# Patient Record
Sex: FEMALE | Race: WHITE | URBAN - METROPOLITAN AREA
[De-identification: names, ages, dates, MRNs, and addresses within clinical notes are randomized per-mention and may not be internally consistent; named-entity substitution may affect disease eponyms.]

---

## 2023-10-12 ENCOUNTER — TRANSCRIBE ORDERS (OUTPATIENT)
Dept: ADMINISTRATIVE | Facility: HOSPITAL | Age: 28
End: 2023-10-12

## 2023-10-12 ENCOUNTER — TRANSCRIBE ORDERS (OUTPATIENT)
Dept: PULMONOLOGY | Facility: HOSPITAL | Age: 28
End: 2023-10-12

## 2023-10-12 DIAGNOSIS — R06.02 SOB (SHORTNESS OF BREATH): Primary | ICD-10-CM

## 2023-10-12 DIAGNOSIS — R06.02 SHORTNESS OF BREATH: Primary | ICD-10-CM

## 2023-10-12 DIAGNOSIS — I31.39 PERICARDIAL EFFUSION: ICD-10-CM

## 2025-01-22 ENCOUNTER — OFFICE VISIT (OUTPATIENT)
Dept: OBSTETRICS AND GYNECOLOGY | Facility: CLINIC | Age: 30
End: 2025-01-22
Payer: COMMERCIAL

## 2025-01-22 VITALS
SYSTOLIC BLOOD PRESSURE: 134 MMHG | BODY MASS INDEX: 40.59 KG/M2 | HEIGHT: 65 IN | WEIGHT: 243.6 LBS | DIASTOLIC BLOOD PRESSURE: 86 MMHG

## 2025-01-22 DIAGNOSIS — F90.9 ATTENTION DEFICIT HYPERACTIVITY DISORDER (ADHD), UNSPECIFIED ADHD TYPE: ICD-10-CM

## 2025-01-22 DIAGNOSIS — Z87.59 HISTORY OF GESTATIONAL HYPERTENSION: ICD-10-CM

## 2025-01-22 DIAGNOSIS — K50.90 CROHN'S DISEASE WITHOUT COMPLICATION, UNSPECIFIED GASTROINTESTINAL TRACT LOCATION: ICD-10-CM

## 2025-01-22 DIAGNOSIS — F31.9 BIPOLAR AFFECTIVE DISORDER, REMISSION STATUS UNSPECIFIED: ICD-10-CM

## 2025-01-22 DIAGNOSIS — O34.219 HISTORY OF CESAREAN DELIVERY, CURRENTLY PREGNANT: ICD-10-CM

## 2025-01-22 DIAGNOSIS — O21.9 NAUSEA AND VOMITING DURING PREGNANCY PRIOR TO 22 WEEKS GESTATION: ICD-10-CM

## 2025-01-22 DIAGNOSIS — R31.9 HEMATURIA, UNSPECIFIED TYPE: ICD-10-CM

## 2025-01-22 DIAGNOSIS — F10.11 HISTORY OF ALCOHOL ABUSE: ICD-10-CM

## 2025-01-22 DIAGNOSIS — Z87.59 HISTORY OF INTRAUTERINE FETAL DEATH IN PREVIOUS PREGNANCY: ICD-10-CM

## 2025-01-22 DIAGNOSIS — F43.10 PTSD (POST-TRAUMATIC STRESS DISORDER): ICD-10-CM

## 2025-01-22 DIAGNOSIS — O99.210 OBESITY AFFECTING PREGNANCY, ANTEPARTUM, UNSPECIFIED OBESITY TYPE: ICD-10-CM

## 2025-01-22 DIAGNOSIS — Z87.59 HISTORY OF TWIN PREGNANCY IN PRIOR PREGNANCY: ICD-10-CM

## 2025-01-22 DIAGNOSIS — N92.6 MISSED MENSES: Primary | ICD-10-CM

## 2025-01-22 DIAGNOSIS — F19.91 HISTORY OF DRUG USE: ICD-10-CM

## 2025-01-22 DIAGNOSIS — F41.9 ANXIETY AND DEPRESSION: ICD-10-CM

## 2025-01-22 DIAGNOSIS — Z86.19 HISTORY OF HEPATITIS C: ICD-10-CM

## 2025-01-22 DIAGNOSIS — F32.A ANXIETY AND DEPRESSION: ICD-10-CM

## 2025-01-22 DIAGNOSIS — O09.899 SHORT INTERVAL BETWEEN PREGNANCIES AFFECTING PREGNANCY, ANTEPARTUM: ICD-10-CM

## 2025-01-22 LAB
B-HCG UR QL: POSITIVE
BILIRUB BLD-MCNC: NEGATIVE MG/DL
CLARITY, POC: CLEAR
COLOR UR: ABNORMAL
EXPIRATION DATE: ABNORMAL
GLUCOSE UR STRIP-MCNC: NEGATIVE MG/DL
INTERNAL NEGATIVE CONTROL: NEGATIVE
INTERNAL POSITIVE CONTROL: POSITIVE
KETONES UR QL: ABNORMAL
LEUKOCYTE EST, POC: ABNORMAL
Lab: 55
NITRITE UR-MCNC: NEGATIVE MG/ML
PH UR: 8 [PH] (ref 5–8)
PROT UR STRIP-MCNC: ABNORMAL MG/DL
RBC # UR STRIP: ABNORMAL /UL
SP GR UR: 1 (ref 1–1.03)
UROBILINOGEN UR QL: ABNORMAL

## 2025-01-22 RX ORDER — VENLAFAXINE HYDROCHLORIDE 37.5 MG/1
CAPSULE, EXTENDED RELEASE ORAL
COMMUNITY

## 2025-01-22 RX ORDER — SWAB
1 SWAB, NON-MEDICATED MISCELLANEOUS DAILY
Qty: 30 EACH | Refills: 6 | Status: SHIPPED | OUTPATIENT
Start: 2025-01-22

## 2025-01-22 RX ORDER — ASPIRIN 81 MG/1
81 TABLET ORAL DAILY
Qty: 30 TABLET | Refills: 6 | Status: SHIPPED | OUTPATIENT
Start: 2025-01-22

## 2025-01-22 RX ORDER — PYRIDOXINE HCL (VITAMIN B6) 25 MG
25 TABLET ORAL
COMMUNITY
Start: 2024-12-30 | End: 2025-02-28

## 2025-01-22 RX ORDER — ONDANSETRON 4 MG/1
4 TABLET, ORALLY DISINTEGRATING ORAL EVERY 8 HOURS PRN
Qty: 30 TABLET | Refills: 2 | Status: SHIPPED | OUTPATIENT
Start: 2025-01-22

## 2025-01-22 RX ORDER — SWAB
1 SWAB, NON-MEDICATED MISCELLANEOUS DAILY
COMMUNITY
Start: 2024-10-04 | End: 2025-01-22 | Stop reason: SDUPTHER
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
## 2025-01-23 PROBLEM — Z87.59 HISTORY OF TWIN PREGNANCY IN PRIOR PREGNANCY: Status: ACTIVE | Noted: 2025-01-23

## 2025-01-23 PROBLEM — Z87.59 HISTORY OF GESTATIONAL HYPERTENSION: Status: ACTIVE | Noted: 2025-01-23

## 2025-01-23 PROBLEM — O09.899 SHORT INTERVAL BETWEEN PREGNANCIES AFFECTING PREGNANCY, ANTEPARTUM: Status: ACTIVE | Noted: 2025-01-23

## 2025-01-23 PROBLEM — Z87.59 HISTORY OF INTRAUTERINE FETAL DEATH IN PREVIOUS PREGNANCY: Status: ACTIVE | Noted: 2025-01-23

## 2025-01-23 PROBLEM — O34.219 HISTORY OF CESAREAN DELIVERY, CURRENTLY PREGNANT: Status: ACTIVE | Noted: 2025-01-23

## 2025-01-23 LAB
BACTERIA UR CULT: NO GROWTH
BACTERIA UR CULT: NORMAL

## 2025-01-24 ENCOUNTER — TELEPHONE (OUTPATIENT)
Dept: OBSTETRICS AND GYNECOLOGY | Facility: CLINIC | Age: 30
End: 2025-01-24

## 2025-01-24 NOTE — TELEPHONE ENCOUNTER
Caller: Adeel Resendiz    Relationship: Self    Best call back number: [phone]    What was the call regarding: PT RETURNING A MISSED CALL

## 2025-01-27 PROBLEM — B00.9 HERPES: Status: ACTIVE | Noted: 2025-01-27

## 2025-01-27 PROBLEM — A60.00 GENITAL HERPES: Status: ACTIVE | Noted: 2025-01-27

## 2025-02-05 ENCOUNTER — INITIAL PRENATAL (OUTPATIENT)
Dept: OBSTETRICS AND GYNECOLOGY | Facility: CLINIC | Age: 30
End: 2025-02-05
Payer: COMMERCIAL

## 2025-02-05 VITALS — BODY MASS INDEX: 39.61 KG/M2 | SYSTOLIC BLOOD PRESSURE: 138 MMHG | DIASTOLIC BLOOD PRESSURE: 80 MMHG | WEIGHT: 238 LBS

## 2025-02-05 DIAGNOSIS — F19.91 HISTORY OF DRUG USE: ICD-10-CM

## 2025-02-05 DIAGNOSIS — Z86.19 HX OF HEPATITIS C: ICD-10-CM

## 2025-02-05 DIAGNOSIS — Z36.9 ENCOUNTER FOR ANTENATAL SCREENING, UNSPECIFIED: Primary | ICD-10-CM

## 2025-02-05 DIAGNOSIS — Z3A.09 9 WEEKS GESTATION OF PREGNANCY: ICD-10-CM

## 2025-02-05 DIAGNOSIS — F10.11 HISTORY OF ALCOHOL ABUSE: ICD-10-CM

## 2025-02-05 DIAGNOSIS — Z01.419 ROUTINE GYNECOLOGICAL EXAMINATION: ICD-10-CM

## 2025-02-05 DIAGNOSIS — Z11.51 SCREENING FOR HUMAN PAPILLOMAVIRUS (HPV): ICD-10-CM

## 2025-02-05 DIAGNOSIS — O16.9 HYPERTENSION AFFECTING PREGNANCY, ANTEPARTUM: ICD-10-CM

## 2025-02-05 LAB
BILIRUB BLD-MCNC: NEGATIVE MG/DL
CLARITY, POC: CLEAR
COLOR UR: YELLOW
GLUCOSE UR STRIP-MCNC: NEGATIVE MG/DL
KETONES UR QL: ABNORMAL
LEUKOCYTE EST, POC: NEGATIVE
NITRITE UR-MCNC: NEGATIVE MG/ML
PH UR: 8 [PH] (ref 5–8)
PROT UR STRIP-MCNC: ABNORMAL MG/DL
RBC # UR STRIP: ABNORMAL /UL
SP GR UR: 1.03 (ref 1–1.03)
UROBILINOGEN UR QL: NORMAL

## 2025-02-05 RX ORDER — GABAPENTIN 300 MG/1
1 CAPSULE ORAL 3 TIMES DAILY
COMMUNITY
Start: 2025-01-07

## 2025-02-05 RX ORDER — CHLORHEXIDINE GLUCONATE ORAL RINSE 1.2 MG/ML
SOLUTION DENTAL
COMMUNITY
Start: 2024-10-29

## 2025-02-05 RX ORDER — BUSPIRONE HYDROCHLORIDE 10 MG/1
1 TABLET ORAL EVERY 12 HOURS SCHEDULED
COMMUNITY
Start: 2024-12-05

## 2025-02-05 RX ORDER — ONDANSETRON 4 MG/1
TABLET, FILM COATED ORAL
COMMUNITY
Start: 2024-10-29

## 2025-02-05 RX ORDER — FLUOROMETHOLONE 1 MG/ML
SUSPENSION/ DROPS OPHTHALMIC
COMMUNITY
Start: 2024-12-16

## 2025-02-05 RX ORDER — FLUOXETINE 40 MG/1
40 CAPSULE ORAL DAILY
COMMUNITY

## 2025-02-05 RX ORDER — CARIPRAZINE 3 MG/1
1 CAPSULE, GELATIN COATED ORAL DAILY
COMMUNITY
Start: 2024-12-08

## 2025-02-05 RX ORDER — FOLIC ACID 1 MG/1
1 TABLET ORAL DAILY
Qty: 90 TABLET | Refills: 3 | Status: SHIPPED | OUTPATIENT
Start: 2025-02-05

## 2025-02-05 RX ORDER — HYDROXYZINE HYDROCHLORIDE 25 MG/1
TABLET, FILM COATED ORAL
COMMUNITY
Start: 2024-10-25

## 2025-02-05 RX ORDER — DEXTROAMPHETAMINE SACCHARATE, AMPHETAMINE ASPARTATE, DEXTROAMPHETAMINE SULFATE AND AMPHETAMINE SULFATE 5; 5; 5; 5 MG/1; MG/1; MG/1; MG/1
TABLET ORAL
COMMUNITY
Start: 2025-01-16

## 2025-02-05 RX ORDER — ALBUTEROL SULFATE 90 UG/1
AEROSOL, METERED RESPIRATORY (INHALATION)
COMMUNITY
Start: 2024-10-25

## 2025-02-05 RX ORDER — CYCLOBENZAPRINE HCL 10 MG
10 TABLET ORAL
COMMUNITY
Start: 2024-11-27

## 2025-02-05 RX ORDER — ALBUTEROL SULFATE 90 UG/1
2 INHALANT RESPIRATORY (INHALATION) EVERY 4 HOURS PRN
Qty: 8 G | Refills: 0 | Status: SHIPPED | OUTPATIENT
Start: 2025-02-05
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
## 2025-02-06 LAB
CREAT UR-MCNC: 80 MG/DL
PROT UR-MCNC: 15.8 MG/DL
PROT/CREAT UR: 198 MG/G CREAT (ref 0–200)

## 2025-02-07 ENCOUNTER — HOSPITAL ENCOUNTER (OUTPATIENT)
Facility: HOSPITAL | Age: 30
Setting detail: OBSERVATION
Discharge: HOME | End: 2025-02-08
Attending: EMERGENCY MEDICINE | Admitting: OBSTETRICS & GYNECOLOGY
Payer: COMMERCIAL

## 2025-02-07 ENCOUNTER — APPOINTMENT (OUTPATIENT)
Dept: ULTRASOUND IMAGING | Facility: HOSPITAL | Age: 30
End: 2025-02-07
Payer: COMMERCIAL

## 2025-02-07 DIAGNOSIS — O03.9 MISCARRIAGE: Primary | ICD-10-CM

## 2025-02-07 LAB
ALBUMIN SERPL-MCNC: 4.2 G/DL (ref 3.5–5.2)
ALBUMIN/GLOB SERPL: 1.4 G/DL
ALP SERPL-CCNC: 91 U/L (ref 39–117)
ALT SERPL W P-5'-P-CCNC: 31 U/L (ref 1–33)
ANION GAP SERPL CALCULATED.3IONS-SCNC: 14.4 MMOL/L (ref 5–15)
AST SERPL-CCNC: 18 U/L (ref 1–32)
BACTERIA UR QL AUTO: ABNORMAL /HPF
BASOPHILS # BLD AUTO: 0.03 10*3/MM3 (ref 0–0.2)
BASOPHILS NFR BLD AUTO: 0.1 % (ref 0–1.5)
BILIRUB SERPL-MCNC: 0.8 MG/DL (ref 0–1.2)
BILIRUB UR QL STRIP: NEGATIVE
BUN SERPL-MCNC: 4 MG/DL (ref 6–20)
BUN/CREAT SERPL: 8 (ref 7–25)
CALCIUM SPEC-SCNC: 9.2 MG/DL (ref 8.6–10.5)
CHLORIDE SERPL-SCNC: 96 MMOL/L (ref 98–107)
CLARITY UR: CLEAR
CO2 SERPL-SCNC: 19.6 MMOL/L (ref 22–29)
COLOR UR: YELLOW
CREAT SERPL-MCNC: 0.5 MG/DL (ref 0.57–1)
DEPRECATED RDW RBC AUTO: 45.8 FL (ref 37–54)
EGFRCR SERPLBLD CKD-EPI 2021: 131.2 ML/MIN/1.73
EOSINOPHIL # BLD AUTO: 0.08 10*3/MM3 (ref 0–0.4)
EOSINOPHIL NFR BLD AUTO: 0.3 % (ref 0.3–6.2)
ERYTHROCYTE [DISTWIDTH] IN BLOOD BY AUTOMATED COUNT: 12.7 % (ref 12.3–15.4)
GLOBULIN UR ELPH-MCNC: 3.1 GM/DL
GLUCOSE SERPL-MCNC: 141 MG/DL (ref 65–99)
GLUCOSE UR STRIP-MCNC: NEGATIVE MG/DL
HCT VFR BLD AUTO: 36.9 % (ref 34–46.6)
HGB BLD-MCNC: 12.2 G/DL (ref 12–15.9)
HGB UR QL STRIP.AUTO: ABNORMAL
HOLD SPECIMEN: NORMAL
HYALINE CASTS UR QL AUTO: ABNORMAL /LPF
IMM GRANULOCYTES # BLD AUTO: 0.05 10*3/MM3 (ref 0–0.05)
IMM GRANULOCYTES NFR BLD AUTO: 0.2 % (ref 0–0.5)
KETONES UR QL STRIP: NEGATIVE
LEUKOCYTE ESTERASE UR QL STRIP.AUTO: ABNORMAL
LYMPHOCYTES # BLD AUTO: 1.58 10*3/MM3 (ref 0.7–3.1)
LYMPHOCYTES NFR BLD AUTO: 6.8 % (ref 19.6–45.3)
MCH RBC QN AUTO: 32.7 PG (ref 26.6–33)
MCHC RBC AUTO-ENTMCNC: 33.1 G/DL (ref 31.5–35.7)
MCV RBC AUTO: 98.9 FL (ref 79–97)
MONOCYTES # BLD AUTO: 0.71 10*3/MM3 (ref 0.1–0.9)
MONOCYTES NFR BLD AUTO: 3 % (ref 5–12)
NEUTROPHILS NFR BLD AUTO: 20.89 10*3/MM3 (ref 1.7–7)
NEUTROPHILS NFR BLD AUTO: 89.6 % (ref 42.7–76)
NITRITE UR QL STRIP: NEGATIVE
PH UR STRIP.AUTO: 6 [PH] (ref 4.5–8)
PLATELET # BLD AUTO: 229 10*3/MM3 (ref 140–450)
PMV BLD AUTO: 10.4 FL (ref 6–12)
POTASSIUM SERPL-SCNC: 3.3 MMOL/L (ref 3.5–5.2)
PROT SERPL-MCNC: 7.3 G/DL (ref 6–8.5)
PROT UR QL STRIP: NEGATIVE
RBC # BLD AUTO: 3.73 10*6/MM3 (ref 3.77–5.28)
RBC # UR STRIP: ABNORMAL /HPF
REF LAB TEST METHOD: ABNORMAL
SODIUM SERPL-SCNC: 130 MMOL/L (ref 136–145)
SP GR UR STRIP: 1.01 (ref 1–1.03)
SQUAMOUS #/AREA URNS HPF: ABNORMAL /HPF
UROBILINOGEN UR QL STRIP: ABNORMAL
WBC # UR STRIP: ABNORMAL /HPF
WBC NRBC COR # BLD AUTO: 23.34 10*3/MM3 (ref 3.4–10.8)

## 2025-02-07 PROCEDURE — 81001 URINALYSIS AUTO W/SCOPE: CPT | Performed by: EMERGENCY MEDICINE

## 2025-02-07 PROCEDURE — 99283 EMERGENCY DEPT VISIT LOW MDM: CPT | Performed by: EMERGENCY MEDICINE

## 2025-02-07 PROCEDURE — 85025 COMPLETE CBC W/AUTO DIFF WBC: CPT | Performed by: EMERGENCY MEDICINE

## 2025-02-07 PROCEDURE — 25010000002 KETOROLAC TROMETHAMINE PER 15 MG: Performed by: EMERGENCY MEDICINE

## 2025-02-07 PROCEDURE — 63710000001 ONDANSETRON ODT 4 MG TABLET DISPERSIBLE: Performed by: OBSTETRICS & GYNECOLOGY

## 2025-02-07 PROCEDURE — G0378 HOSPITAL OBSERVATION PER HR: HCPCS

## 2025-02-07 PROCEDURE — 96372 THER/PROPH/DIAG INJ SC/IM: CPT

## 2025-02-07 PROCEDURE — 99285 EMERGENCY DEPT VISIT HI MDM: CPT

## 2025-02-07 PROCEDURE — 87591 N.GONORRHOEAE DNA AMP PROB: CPT | Performed by: EMERGENCY MEDICINE

## 2025-02-07 PROCEDURE — 88305 TISSUE EXAM BY PATHOLOGIST: CPT | Performed by: OBSTETRICS & GYNECOLOGY

## 2025-02-07 PROCEDURE — 80053 COMPREHEN METABOLIC PANEL: CPT | Performed by: EMERGENCY MEDICINE

## 2025-02-07 PROCEDURE — 87491 CHLMYD TRACH DNA AMP PROBE: CPT | Performed by: EMERGENCY MEDICINE

## 2025-02-07 RX ORDER — HYDROCODONE BITARTRATE AND ACETAMINOPHEN 5; 325 MG/1; MG/1
1 TABLET ORAL ONCE
Status: COMPLETED | OUTPATIENT
Start: 2025-02-07 | End: 2025-02-07

## 2025-02-07 RX ORDER — KETOROLAC TROMETHAMINE 30 MG/ML
30 INJECTION, SOLUTION INTRAMUSCULAR; INTRAVENOUS ONCE
Status: COMPLETED | OUTPATIENT
Start: 2025-02-07 | End: 2025-02-07

## 2025-02-07 RX ORDER — HYDROCODONE BITARTRATE AND ACETAMINOPHEN 5; 325 MG/1; MG/1
1 TABLET ORAL EVERY 4 HOURS PRN
Status: DISCONTINUED | OUTPATIENT
Start: 2025-02-07 | End: 2025-02-08 | Stop reason: HOSPADM

## 2025-02-07 RX ORDER — ONDANSETRON 4 MG/1
4 TABLET, ORALLY DISINTEGRATING ORAL EVERY 6 HOURS PRN
Status: DISCONTINUED | OUTPATIENT
Start: 2025-02-07 | End: 2025-02-08 | Stop reason: HOSPADM

## 2025-02-07 RX ORDER — GUAIFENESIN 600 MG/1
600 TABLET, EXTENDED RELEASE ORAL EVERY 12 HOURS SCHEDULED
Status: DISCONTINUED | OUTPATIENT
Start: 2025-02-07 | End: 2025-02-08 | Stop reason: HOSPADM

## 2025-02-07 RX ORDER — KETOROLAC TROMETHAMINE 30 MG/ML
15 INJECTION, SOLUTION INTRAMUSCULAR; INTRAVENOUS ONCE
Status: DISCONTINUED | OUTPATIENT
Start: 2025-02-07 | End: 2025-02-07

## 2025-02-07 RX ORDER — IBUPROFEN 600 MG/1
600 TABLET, FILM COATED ORAL EVERY 6 HOURS PRN
Status: DISCONTINUED | OUTPATIENT
Start: 2025-02-07 | End: 2025-02-08 | Stop reason: HOSPADM

## 2025-02-07 RX ADMIN — HYDROCODONE BITARTRATE AND ACETAMINOPHEN 1 TABLET: 5; 325 TABLET ORAL at 15:52

## 2025-02-07 RX ADMIN — IBUPROFEN 600 MG: 600 TABLET, FILM COATED ORAL at 17:06

## 2025-02-07 RX ADMIN — KETOROLAC TROMETHAMINE 30 MG: 30 INJECTION, SOLUTION INTRAMUSCULAR; INTRAVENOUS at 10:52

## 2025-02-07 RX ADMIN — GUAIFENESIN 600 MG: 600 TABLET, EXTENDED RELEASE ORAL at 20:59

## 2025-02-07 RX ADMIN — ONDANSETRON 4 MG: 4 TABLET, ORALLY DISINTEGRATING ORAL at 13:21

## 2025-02-07 RX ADMIN — HYDROCODONE BITARTRATE AND ACETAMINOPHEN 1 TABLET: 5; 325 TABLET ORAL at 12:50
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
## 2025-02-07 NOTE — H&P
Patient Care Team:  Manuel Evans MD as PCP - General (Family Medicine)  Lexis Wilkersno APRN as Nurse Practitioner (Obstetrics and Gynecology)    Chief complaint pelvic cramping, miscarriage       HPI: This is a 29-year-old  6 para 2-2-2-4 at 10 weeks and 1 day by LMP and first trimester ultrasound.  Had severe onset cramping last night followed by active heavy bleeding and passage of large amount of tissue including a miscarriage.  Patient has previously miscarried before.  Bleeding slowed slightly but cramping persisted into this morning when she presented to the ED with cramping.  ED evaluated the patient.  Small amount of bleeding from the cervical os.  Patient had refused pelvic ultrasound.  She is Rh+.  Hemoglobin is 12.2 but white count was 23,000.  Admitted for observation due to elevated white blood cell count.    ROS:   Constitutional: Negative for appetite change, fever and unexpected weight change.   Respiratory: Negative for cough and shortness of breath.    Cardiovascular: Negative for chest pain and palpitations.   Gastrointestinal: Negative for abdominal distention, abdominal pain, constipation, diarrhea, nausea and vomiting.   Endocrine: Negative.    Genitourinary: Negative for dyspareunia, menstrual problem is present (see HPI, pelvic pain is present in the form of cramping and vaginal discharge other than bleeding.   Skin: Negative.    Neurological: Negative for dizziness and headaches.   Hematological: Negative.    Psychiatric/Behavioral: Negative for dysphoric mood and sleep disturbance. The patient is not nervous/anxious.        PMH:   Past Medical History:   Diagnosis Date    ADHD     Anxiety and depression     Bipolar affective disorder     Crohn's disease     History of hepatitis C     PTSD (post-traumatic stress disorder)          PSH:   Past Surgical History:   Procedure Laterality Date    ANKLE SURGERY Left 2019    MANDIBLE FRACTURE SURGERY  2024    surgery  x 2; metal plates       SoHx:   Social History     Socioeconomic History    Marital status: Single   Tobacco Use    Smoking status: Every Day     Types: Cigarettes    Smokeless tobacco: Never   Vaping Use    Vaping status: Every Day    Substances: Nicotine, THC, Flavoring   Substance and Sexual Activity    Alcohol use: Yes     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     Current Outpatient Medications on File Prior to Encounter   Medication Sig Dispense Refill    busPIRone (BUSPAR) 10 MG tablet Take 1 tablet by mouth Every 12 (Twelve) Hours.      chlorhexidine (PERIDEX) 0.12 % solution       cyclobenzaprine (FLEXERIL) 10 MG tablet Take 1 tablet by mouth.      FLUoxetine (PROzac) 20 MG capsule Take 1 capsule by mouth Daily.      FLUoxetine (PROzac) 40 MG capsule Take 1 capsule by mouth Daily.      folic acid (FOLVITE) 1 MG tablet Take 1 tablet by mouth Daily. 90 tablet 3    melatonin 5 MG tablet tablet       ondansetron (ZOFRAN) 4 MG tablet       ondansetron ODT (ZOFRAN-ODT) 4 MG disintegrating tablet Place 1 tablet on the tongue Every 8 (Eight) Hours As Needed for Nausea or Vomiting. 30 tablet 2    Prenatal 28-0.8 MG tablet Take 1 tablet by mouth Daily. 30 each 6    albuterol sulfate  (90 Base) MCG/ACT inhaler Inhale 2 puffs Every 4 (Four) Hours As Needed for Wheezing. 8 g 0    amphetamine-dextroamphetamine (ADDERALL) 20 MG tablet TAKE 1 TABLET BY MOUTH TWICE DAILY FOR ADHD SYMPTOMS      aspirin 81 MG EC tablet Take 1 tablet by mouth Daily. 30 tablet 6    fluorometholone (FML) 0.1 % ophthalmic suspension INSTILL 1  DROP INTO RIGHT EYE 4 TIMES DAILY AS DIRECTED FOR 7 DAYS      gabapentin (NEURONTIN) 300 MG capsule Take 1 capsule by mouth 3 times a day. (Patient not taking: Reported on 2/5/2025)      hydrOXYzine (ATARAX) 25 MG tablet  (Patient not taking: Reported on 2/5/2025)      venlafaxine XR (Effexor XR) 37.5 MG 24 hr capsule Take  by mouth. (Patient not taking: Reported on 2/5/2025)      Ventolin  (90 Base) MCG/ACT inhaler       Vraylar 3 MG capsule capsule Take 1 capsule by mouth Daily. (Patient not taking: Reported on 2/5/2025)               Vital Signs  Temp:  [98.8 °F (37.1 °C)-98.9 °F (37.2 °C)] 98.9 °F (37.2 °C)  Heart Rate:  [100-115] 100  Resp:  [16-18] 16  BP: (114-134)/(65-81) 134/81    Physical Exam:  Constitutional: She is oriented to person, place, and time. She appears well-developed and well-nourished.   HENT:   Head: Normocephalic and atraumatic.   Cardiovascular: Normal rate and regular rhythm.    Pulmonary/Chest: Effort normal. No respiratory distress.   Abdominal: Soft. Bowel sounds are normal. She exhibits no distension and no mass. There is no tenderness. There is no rebound and no guarding.   Musculoskeletal: Normal range of motion.   Neurological: She is alert and oriented to person, place, and time.   Skin: Skin is warm and dry.   Psychiatric: She has a normal mood and affect. Her behavior is normal. Judgment and thought content normal.   Nursing note and vitals reviewed.  Debilities/Disabilities Identified: None  Emotional Behavior: Appropriate    Labs: Rh+, normal hemoglobin      Assessment/Plan: Completed SAB    Patient has declined ultrasound.  Suspect completed SAB.  Bleeding has decreased and physical exam uterus is not palpable.  No acute abdominal signs.  Observe overnight for fever, vital signs and pad count.  Would recommend repeat ultrasound to confirm empty uterus.  Repeat CBC in the a.m.  Patient and significant other agreeable with plan.    I discussed the patients findings and  my recommendations with patient and family.     José Miguel Stevens MD  02/07/25  15:25 EST

## 2025-02-07 NOTE — Clinical Note
Level of Care: Women's Health [27]   Diagnosis: Miscarriage [309066]   Admitting Physician: TRAN VARGAS [[id number]]

## 2025-02-07 NOTE — PLAN OF CARE
Problem: Adult Inpatient Plan of Care  Goal: Plan of Care Review  Outcome: Progressing  Goal: Patient-Specific Goal (Individualized)  Outcome: Progressing  Goal: Absence of Hospital-Acquired Illness or Injury  Outcome: Progressing  Intervention: Identify and Manage Fall Risk  Recent Flowsheet Documentation  Taken 2025 1250 by Adriana Cordero RN  Safety Promotion/Fall Prevention: safety round/check completed  Goal: Optimal Comfort and Wellbeing  Outcome: Progressing  Intervention: Monitor Pain and Promote Comfort  Recent Flowsheet Documentation  Taken 2025 1706 by Adriana Cordero RN  Pain Management Interventions:   pain medication given   awakened for pain meds per patient request  Taken 2025 1552 by Adriana Cordero RN  Pain Management Interventions: pain medication given  Taken 2025 1345 by Adriana Cordero RN  Pain Management Interventions: no interventions per patient request  Taken 2025 1250 by Adriana Cordero, RN  Pain Management Interventions: pain medication given  Goal: Readiness for Transition of Care  Outcome: Progressing     Problem:  Loss  Goal: Optimal Adjustment to Loss  Outcome: Progressing   Goal Outcome Evaluation:

## 2025-02-07 NOTE — ED PROVIDER NOTES
Subjective   History of Present Illness    Chief complaint: Bleeding    Location: Vagina    Quality/Severity: Bright red    Timing/Onset/Duration: Started last night     Modifying Factors: No modifying factors    Associated Symptoms: Lower abdominal pain.  No fever.  No nausea or vomiting.    Narrative: This 29-year-old white female presents with vaginal bleeding since last night.  Patient states she has been having bright red vaginal bleeding.  The patient is a G3 para 2.  She is 10 weeks 2 days by ultrasound done on February 5, 2025.    OB/GYN: [place]    Review of Systems   Constitutional:  Negative for chills and fever.   Gastrointestinal:  Positive for abdominal pain.   Genitourinary:  Positive for vaginal bleeding.       Past Medical History:   Diagnosis Date    ADHD     Anxiety and depression     Bipolar affective disorder     Crohn's disease     History of hepatitis C     PTSD (post-traumatic stress disorder)        Allergies   Allergen Reactions    Cat Dander Other (See Comments)     Allergic to cats       Past Surgical History:   Procedure Laterality Date    ANKLE SURGERY Left 2019    MANDIBLE FRACTURE SURGERY  08/2024    surgery x 2; metal plates       Family History   Problem Relation Age of Onset    Asthma Mother     Hearing loss Mother        Social History     Socioeconomic History    Marital status: Single   Tobacco Use    Smoking status: Every Day     Types: Cigarettes    Smokeless tobacco: Never   Vaping Use    Vaping status: Every Day   Substance and Sexual Activity    Alcohol use: Yes     Comment: 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   Constitutional:       Appearance: Normal appearance.    Abdominal:      General: Abdomen is flat. Bowel sounds are normal. There is no distension.      Palpations: Abdomen is soft. There is no mass.      Tenderness: There is abdominal tenderness (Moderate diffuse tenderness). There is no right CVA tenderness, left CVA tenderness, guarding or rebound.      Hernia: No hernia is present.   Genitourinary:     Comments: Pelvic exam: No external lesions noted.  There is a trickle of blood in the vaginal vault that is dark.  There is mild cervical motion tenderness and adnexal tenderness there is no masses.  It appears that there is an embryo the patient has passed.  Skin:     General: Skin is warm and dry.   Neurological:      General: No focal deficit present.      Mental Status: She is alert and oriented to person, place, and time.         Procedures           ED Course  ED Course as of 02/07/25 1128   Fri Feb 07, 2025   1123 The white blood cell count is 23,000, hemoglobin is 12, hematocrit is 36.  Absolute neutrophil count is 20. [RC]      ED Course User Index  [RC] Scott Childers MD        10:27 EST, 02/07/25:  Ultrasound performed February 5, 2025 shows single vaginal intrauterine pregnancy at 10 weeks 0 days.  Both ovaries appear normal.  This is read by Dr. Lin.    11:18 EST, 02/07/25:  The patient wishes to leave and go home.  Her vital signs are stable.  She states that she is no longer bleeding.  The patient understands the risk versus the benefits of leaving prior to completion of the workup.  She has been advised to rest.  She should avoid strenuous physical activity and sexual intercourse.  She should not lift anything heavier than a gallon of milk.  He should return to the emergency department there is worsening pain, fever, worsening bleeding, bleeding heavier than usual period, worse anyway at all.  The patient is leaving AGAINST MEDICAL ADVICE.  Refuses her ultrasound.  I spoke with Dr. Stevens, on-call for OB/GYN.  He would like the patient seen  in the office on Monday.  The patient's question were answered she will be discharged in good condition.    11:29 EST, 02/07/25:  The patient's white blood cell count returned at 23,000.  I went back and talk to the patient and advised her that she should be admitted, and observe for possible infection.  The patient has agreed to stay.  I spoke with Dr. Stevens and he will bring the patient in for observation.                                               Medical Decision Making      Final diagnoses:   Miscarriage       ED Disposition  ED Disposition       None            No follow-up provider specified.       Medication List      No changes were made to your prescriptions during this visit.       No orders to display     Labs Reviewed - No data to display  US Ob Transvaginal    Result Date: 2/6/2025  Narrative: US IMP: SINGLE VIUP @ 10W0D BOTH OVS WNL Ernie Martínez MD     Final diagnoses:   None         ED Medications:  Medications - No data to display    New Medications:     Medication List        ASK your doctor about these medications      amphetamine-dextroamphetamine 20 MG tablet  Commonly known as: ADDERALL     aspirin 81 MG EC tablet  Take 1 tablet by mouth Daily.     busPIRone 10 MG tablet  Commonly known as: BUSPAR     chlorhexidine 0.12 % solution  Commonly known as: PERIDEX     cyclobenzaprine 10 MG tablet  Commonly known as: FLEXERIL     Effexor XR 37.5 MG 24 hr capsule  Generic drug: venlafaxine XR     fluorometholone 0.1 % ophthalmic suspension  Commonly known as: FML     * FLUoxetine 20 MG capsule  Commonly known as: PROzac     * FLUoxetine 40 MG capsule  Commonly known as: PROzac     folic acid 1 MG tablet  Commonly known as: FOLVITE  Take 1 tablet by mouth Daily.     gabapentin 300 MG capsule  Commonly known as: NEURONTIN     hydrOXYzine 25 MG tablet  Commonly known as: ATARAX     melatonin 5 MG tablet tablet     ondansetron 4 MG tablet  Commonly known as: ZOFRAN     ondansetron ODT 4  MG disintegrating tablet  Commonly known as: ZOFRAN-ODT  Place 1 tablet on the tongue Every 8 (Eight) Hours As Needed for Nausea or Vomiting.     Prenatal 28-0.8 MG tablet  Take 1 tablet by mouth Daily.     * Ventolin  (90 Base) MCG/ACT inhaler  Generic drug: albuterol sulfate HFA     * albuterol sulfate  (90 Base) MCG/ACT inhaler  Commonly known as: PROVENTIL HFA;VENTOLIN HFA;PROAIR HFA  Inhale 2 puffs Every 4 (Four) Hours As Needed for Wheezing.     Vraylar 3 MG capsule capsule  Generic drug: Cariprazine HCl           * This list has 4 medication(s) that are the same as other medications prescribed for you. Read the directions carefully, and ask your doctor or other care provider to review them with you.                  Stopped Medications:     Medication List        ASK your doctor about these medications      amphetamine-dextroamphetamine 20 MG tablet  Commonly known as: ADDERALL     aspirin 81 MG EC tablet  Take 1 tablet by mouth Daily.     busPIRone 10 MG tablet  Commonly known as: BUSPAR     chlorhexidine 0.12 % solution  Commonly known as: PERIDEX     cyclobenzaprine 10 MG tablet  Commonly known as: FLEXERIL     Effexor XR 37.5 MG 24 hr capsule  Generic drug: venlafaxine XR     fluorometholone 0.1 % ophthalmic suspension  Commonly known as: FML     * FLUoxetine 20 MG capsule  Commonly known as: PROzac     * FLUoxetine 40 MG capsule  Commonly known as: PROzac     folic acid 1 MG tablet  Commonly known as: FOLVITE  Take 1 tablet by mouth Daily.     gabapentin 300 MG capsule  Commonly known as: NEURONTIN     hydrOXYzine 25 MG tablet  Commonly known as: ATARAX     melatonin 5 MG tablet tablet     ondansetron 4 MG tablet  Commonly known as: ZOFRAN     ondansetron ODT 4 MG disintegrating tablet  Commonly known as: ZOFRAN-ODT  Place 1 tablet on the tongue Every 8 (Eight) Hours As Needed for Nausea or Vomiting.     Prenatal 28-0.8 MG tablet  Take 1 tablet by mouth Daily.     * Ventolin  (90  Base) MCG/ACT inhaler  Generic drug: albuterol sulfate HFA     * albuterol sulfate  (90 Base) MCG/ACT inhaler  Commonly known as: PROVENTIL HFA;VENTOLIN HFA;PROAIR HFA  Inhale 2 puffs Every 4 (Four) Hours As Needed for Wheezing.     Vraylar 3 MG capsule capsule  Generic drug: Cariprazine HCl           * This list has 4 medication(s) that are the same as other medications prescribed for you. Read the directions carefully, and ask your doctor or other care provider to review them with you.                       Scott Childers MD  02/07/25 [id number]

## 2025-02-08 VITALS
TEMPERATURE: 98.2 F | RESPIRATION RATE: 16 BRPM | DIASTOLIC BLOOD PRESSURE: 56 MMHG | BODY MASS INDEX: 40.98 KG/M2 | WEIGHT: 246 LBS | HEART RATE: 89 BPM | HEIGHT: 65 IN | OXYGEN SATURATION: 97 % | SYSTOLIC BLOOD PRESSURE: 104 MMHG

## 2025-02-08 LAB
BASOPHILS # BLD AUTO: 0.05 10*3/MM3 (ref 0–0.2)
BASOPHILS NFR BLD AUTO: 0.4 % (ref 0–1.5)
DEPRECATED RDW RBC AUTO: 47 FL (ref 37–54)
EOSINOPHIL # BLD AUTO: 0.24 10*3/MM3 (ref 0–0.4)
EOSINOPHIL NFR BLD AUTO: 2.1 % (ref 0.3–6.2)
ERYTHROCYTE [DISTWIDTH] IN BLOOD BY AUTOMATED COUNT: 13.2 % (ref 12.3–15.4)
HCT VFR BLD AUTO: 33.3 % (ref 34–46.6)
HGB BLD-MCNC: 10.9 G/DL (ref 12–15.9)
IMM GRANULOCYTES # BLD AUTO: 0.04 10*3/MM3 (ref 0–0.05)
IMM GRANULOCYTES NFR BLD AUTO: 0.4 % (ref 0–0.5)
LYMPHOCYTES # BLD AUTO: 1.78 10*3/MM3 (ref 0.7–3.1)
LYMPHOCYTES NFR BLD AUTO: 15.6 % (ref 19.6–45.3)
MCH RBC QN AUTO: 32.2 PG (ref 26.6–33)
MCHC RBC AUTO-ENTMCNC: 32.7 G/DL (ref 31.5–35.7)
MCV RBC AUTO: 98.2 FL (ref 79–97)
MONOCYTES # BLD AUTO: 0.62 10*3/MM3 (ref 0.1–0.9)
MONOCYTES NFR BLD AUTO: 5.4 % (ref 5–12)
NEUTROPHILS NFR BLD AUTO: 76.1 % (ref 42.7–76)
NEUTROPHILS NFR BLD AUTO: 8.68 10*3/MM3 (ref 1.7–7)
NRBC BLD AUTO-RTO: 0 /100 WBC (ref 0–0.2)
PLATELET # BLD AUTO: 178 10*3/MM3 (ref 140–450)
PMV BLD AUTO: 10.6 FL (ref 6–12)
RBC # BLD AUTO: 3.39 10*6/MM3 (ref 3.77–5.28)
WBC NRBC COR # BLD AUTO: 11.41 10*3/MM3 (ref 3.4–10.8)

## 2025-02-08 PROCEDURE — G0378 HOSPITAL OBSERVATION PER HR: HCPCS

## 2025-02-08 PROCEDURE — 85025 COMPLETE CBC W/AUTO DIFF WBC: CPT | Performed by: OBSTETRICS & GYNECOLOGY

## 2025-02-08 RX ORDER — IBUPROFEN 600 MG/1
600 TABLET, FILM COATED ORAL EVERY 6 HOURS PRN
Qty: 12 TABLET | Refills: 0 | Status: SHIPPED | OUTPATIENT
Start: 2025-02-08 | End: 2025-02-12 | Stop reason: HOSPADM

## 2025-02-08 RX ADMIN — GUAIFENESIN 600 MG: 600 TABLET, EXTENDED RELEASE ORAL at 09:18

## 2025-02-08 RX ADMIN — HYDROCODONE BITARTRATE AND ACETAMINOPHEN 1 TABLET: 5; 325 TABLET ORAL at 09:18

## 2025-02-08 RX ADMIN — HYDROCODONE BITARTRATE AND ACETAMINOPHEN 1 TABLET: 5; 325 TABLET ORAL at 04:46

## 2025-02-08 NOTE — PLAN OF CARE
Problem: Adult Inpatient Plan of Care  Goal: Plan of Care Review  Outcome: Met  Goal: Patient-Specific Goal (Individualized)  Outcome: Met  Goal: Absence of Hospital-Acquired Illness or Injury  Outcome: Met  Intervention: Identify and Manage Fall Risk  Recent Flowsheet Documentation  Taken 2025 by Vidhya Schmid, RN  Safety Promotion/Fall Prevention: safety round/check completed  Goal: Optimal Comfort and Wellbeing  Outcome: Met  Intervention: Provide Person-Centered Care  Recent Flowsheet Documentation  Taken 2025 by Vidhya Schmid RN  Trust Relationship/Rapport:   care explained   choices provided   empathic listening provided   emotional support provided   questions answered   questions encouraged   reassurance provided   thoughts/feelings acknowledged  Goal: Readiness for Transition of Care  Outcome: Met     Problem:  Loss  Goal: Optimal Adjustment to Loss  Outcome: Met   Goal Outcome Evaluation:

## 2025-02-08 NOTE — DISCHARGE SUMMARY
Date of Admission: 2025  9:36 AM      Date of Discharge:  2025    Admitting Service: [facility]    Admission Diagnosis: Spontaneous , leukocytosis  Discharge Diagnosis: Same    Presenting Problem/History of Present Illness  Miscarriage [O03.9]       Hospital Course  Patient is a 29 y.o. female presented with heavy bleeding and passage of tissue at home.  She was seen by the admitting ED physician.  Upon exam there was a slight trickle of blood coming from the cervical os.  She gave a history of passing large amount of tissue at home.  Had persistent cramping.  Cervical os was closed.  Admission hemoglobin was 12.2 and her vital signs were normal.  ED physician was concerned with a white count of 23,000 and wanted to admit for observation to rule out infection.  Upon admission she had a nontender abdomen.  No surgical abdomen signs were present.  Bleeding was scant on the pad.  She was observed overnight.  She remained afebrile and her vital signs were normal.  Repeat hemoglobin in the morning was 10.9.  White count fell to 11.4.  Patient was ambulating and voiding without difficulty.  No orthostatic symptoms.  Heart rate was in the 80s.  She is Rh+.  Bleeding warnings given.  Recommend follow-up in office early next week with an ultrasound and repeat quantitative hCG.  Patient agreeable with plan.    Procedures Performed         Consults:   Consults       No orders found for last 30 day(s).            Pertinent Test Results: labs: Rh+, discharge hemoglobin 10.9.  Leukocytosis resolved    Condition on Discharge: Stable    Vital Signs  Temp:  [97.7 °F (36.5 °C)-98.9 °F (37.2 °C)] 98.2 °F (36.8 °C)  Heart Rate:  [] 89  Resp:  [16-18] 16  BP: (104-134)/(56-81) 104/56    Physical Exam:   Awake alert and oriented x 3.  No acute distress.  Abdomen soft, nontender nondistended.  Scant bleeding on pad.  No cords.    Discharge Disposition  Home or Self Care    Discharge Medications     Discharge Medications         New Medications        Instructions Start Date   ibuprofen 600 MG tablet  Commonly known as: ADVIL,MOTRIN   600 mg, Oral, Every 6 Hours PRN             Continue These Medications        Instructions Start Date   amphetamine-dextroamphetamine 20 MG tablet  Commonly known as: ADDERALL   TAKE 1 TABLET BY MOUTH TWICE DAILY FOR ADHD SYMPTOMS      aspirin 81 MG EC tablet   81 mg, Oral, Daily      busPIRone 10 MG tablet  Commonly known as: BUSPAR   1 tablet, Every 12 Hours Scheduled      chlorhexidine 0.12 % solution  Commonly known as: PERIDEX       cyclobenzaprine 10 MG tablet  Commonly known as: FLEXERIL   10 mg      Effexor XR 37.5 MG 24 hr capsule  Generic drug: venlafaxine XR   Take  by mouth.      fluorometholone 0.1 % ophthalmic suspension  Commonly known as: FML   INSTILL 1 DROP INTO RIGHT EYE 4 TIMES DAILY AS DIRECTED FOR 7 DAYS      FLUoxetine 20 MG capsule  Commonly known as: PROzac   1 capsule, Daily      FLUoxetine 40 MG capsule  Commonly known as: PROzac   40 mg, Daily      folic acid 1 MG tablet  Commonly known as: FOLVITE   1 mg, Oral, Daily      gabapentin 300 MG capsule  Commonly known as: NEURONTIN   1 capsule, 3 times daily      hydrOXYzine 25 MG tablet  Commonly known as: ATARAX       melatonin 5 MG tablet tablet       ondansetron 4 MG tablet  Commonly known as: ZOFRAN       ondansetron ODT 4 MG disintegrating tablet  Commonly known as: ZOFRAN-ODT   4 mg, Translingual, Every 8 Hours PRN      Prenatal 28-0.8 MG tablet   1 tablet, Oral, Daily      Ventolin  (90 Base) MCG/ACT inhaler  Generic drug: albuterol sulfate HFA       albuterol sulfate  (90 Base) MCG/ACT inhaler  Commonly known as: PROVENTIL HFA;VENTOLIN HFA;PROAIR HFA   2 puffs, Inhalation, Every 4 Hours PRN      Vraylar 3 MG capsule capsule  Generic drug: Cariprazine HCl   1 capsule, Daily               Discharge Diet:     Activity at Discharge:     Follow-up Appointments  Future Appointments   Date Time Provider Department  [place]   3/5/2025  2:00 PM Malathi Johnston, APRN [facility]     Additional Instructions for the Follow-ups that You Need to Schedule       Discharge Follow-up with Specialty: [facility]; 1 Week   As directed      Specialty: [facility]   Follow Up: 1 Week   Follow Up Details: with US to document completed SAB                Test Results Pending at Discharge  Pending Labs       Order Current Status    Chlamydia trachomatis, Neisseria gonorrhoeae, PCR w/ confirmation - Urine, Urine, Clean Catch In process    Tissue Pathology Exam In process             José Miguel Stevens MD  02/08/25  08:50 EST    Time:  0853

## 2025-02-08 NOTE — PLAN OF CARE
Problem: Adult Inpatient Plan of Care  Goal: Plan of Care Review  Outcome: Progressing  Flowsheets (Taken 2/8/2025 0458)  Progress: improving  Outcome Evaluation: VSS. Pt reports bright red bleeding same from previous day. Denies large clots. Pt reports cramps 8/10, sleeping in between vital checks. Meds reviewed with pt. Heating pad given. Pt reports feeling very sad about loss.  Plan of Care Reviewed With: patient   Goal Outcome Evaluation:  Plan of Care Reviewed With: patient        Progress: improving  Outcome Evaluation: VSS. Pt reports bright red bleeding same from previous day. Denies large clots. Pt reports cramps 8/10, sleeping in between vital checks. Meds reviewed with pt. Heating pad given. Pt reports feeling very sad about loss.

## 2025-02-10 LAB
AMPHETAMINES UR QL SCN: NEGATIVE NG/ML
BACTERIA UR CULT: ABNORMAL
BACTERIA UR CULT: ABNORMAL
BARBITURATES UR QL SCN: NEGATIVE NG/ML
BENZODIAZ UR QL SCN: NEGATIVE NG/ML
BUPRENORPHINE UR QL: NEGATIVE NG/ML
BZE UR QL SCN: NEGATIVE NG/ML
C TRACH RRNA CVX QL NAA+PROBE: NEGATIVE
CANNABINOIDS UR QL SCN: POSITIVE NG/ML
CONV .: NORMAL
CREAT UR-MCNC: 43.8 MG/DL (ref 20–300)
CYTO UR: NORMAL
CYTOLOGIST CVX/VAG CYTO: NORMAL
CYTOLOGY CVX/VAG DOC CYTO: NORMAL
CYTOLOGY CVX/VAG DOC THIN PREP: NORMAL
DX ICD CODE: NORMAL
FENTANYL UR-MCNC: NEGATIVE PG/ML
LAB AP CASE REPORT: NORMAL
LABORATORY COMMENT REPORT: ABNORMAL
Lab: NORMAL
MEPERIDINE UR QL: NEGATIVE NG/ML
METHADONE UR QL SCN: NEGATIVE NG/ML
N GONORRHOEA RRNA CVX QL NAA+PROBE: NEGATIVE
OPIATES UR QL SCN: NEGATIVE NG/ML
OTHER ANTIBIOTIC SUSC ISLT: ABNORMAL
OTHER STN SPEC: NORMAL
OXYCODONE+OXYMORPHONE UR QL SCN: NEGATIVE NG/ML
PATH REPORT.FINAL DX SPEC: NORMAL
PATH REPORT.GROSS SPEC: NORMAL
PCP UR QL: NEGATIVE NG/ML
PH UR: 6.9 [PH] (ref 4.5–8.9)
PROPOXYPH UR QL SCN: NEGATIVE NG/ML
SERVICE CMNT-IMP: NORMAL
STAT OF ADQ CVX/VAG CYTO-IMP: NORMAL
T VAGINALIS RRNA SPEC QL NAA+PROBE: NEGATIVE
TRAMADOL UR QL SCN: NEGATIVE NG/ML

## 2025-02-10 NOTE — PAYOR COMM NOTE
"Adeel Mcgowan (29 y.o. Female)    Obs   Date of Birth   1995    Social Security Number       Address   [address] [place] [address]    Home Phone   [phone]    MRN   [id number]       Jehovah's witness   None    Marital Status   Single                            Admission Date   25    Admission Type   Emergency    Admitting Provider   José Miguel Stevens MD    Attending Provider       Department, Room/Bed   [facility] OB GYN, 1117/       Discharge Date   2025    Discharge Disposition   Home or Self Care    Discharge Destination                                 Attending Provider: (none)   Allergies: Cat Dander    Isolation: None   Infection: None   Code Status: Not on file    Ht: 165.1 cm (65\")   Wt: 112 kg (246 lb)    Admission Cmt: None   Principal Problem: Miscarriage [O03.9]                   Active Insurance as of 2025       Primary Coverage       Payor Plan Insurance Group Employer/Plan Group    [employer] [place] [employer] [place]ICA[place]        Payor Plan Address Payor Plan Phone Number Payor Plan Fax Number Effective Dates     [address] [address] [phone]  10/19/2023 - None Entered    [place] [address]         Subscriber Name Subscriber Birth Date Member ID       ADEEL MCGOWAN 1995 [id number]                     Emergency Contacts        (Rel.) Home Phone Work Phone Mobile Phone    DAI العراقي (Significant Other) [phone] -- [phone]              Insurance Information                  [facility]/WELL[employer]ARE MEDICAID Phone: [phone]    Subscriber: Adeel Mcgowan Subscriber#: [id number]    Group#: -- Precert#: --    Authorization#: -- Effective Date: --          Problem List             Codes Noted - Resolved       Hospital    * (Principal) Miscarriage ICD-10-CM: O03.9  ICD-9-CM: 634.90 2025 - Present       Non-Hospital    Genital herpes ICD-10-CM: A60.00  ICD-9-CM: 054.10 2025 - Present    History of  delivery, " currently pregnant ICD-10-CM: O34.219  ICD-9-CM: 654.20 2025 - Present    History of twin pregnancy in prior pregnancy ICD-10-CM: Z87.59  ICD-9-CM: V13.29 2025 - Present    History of gestational hypertension ICD-10-CM: Z87.59  ICD-9-CM: V13.29 2025 - Present    History of intrauterine fetal death in previous pregnancy- disappearing twin @ 15wga ICD-10-CM: Z87.59  ICD-9-CM: V13.29 2025 - Present    Short interval between pregnancies affecting pregnancy, antepartum- C/S 2024 ICD-10-CM: O09.899  ICD-9-CM: V23.89 2025 - Present    Crohn's disease ICD-10-CM: K50.90  ICD-9-CM: 555.9 Unknown - Present    PTSD (post-traumatic stress disorder) ICD-10-CM: F43.10  ICD-9-CM: 309.81 Unknown - Present    History of hepatitis C ICD-10-CM: Z86.19  ICD-9-CM: V12.09 Unknown - Present    Bipolar affective disorder ICD-10-CM: F31.9  ICD-9-CM: 296.80 Unknown - Present    Anxiety and depression ICD-10-CM: F41.9, F32.A  ICD-9-CM: 300.00, 311 Unknown - Present    ADHD ICD-10-CM: F90.9  ICD-9-CM: 314.01 Unknown - Present    History of drug use- meth, THC ICD-10-CM: F19.91  ICD-9-CM: 305.93 2025 - Present    History of alcohol abuse- last used 2 months ago ICD-10-CM: F10.11  ICD-9-CM: 305.03 2025 - Present    Obesity affecting pregnancy, antepartum ICD-10-CM: O99.210  ICD-9-CM: 649.13 2025 - Present        History & Physical        McDJosé Miguel gomez MD at 25 1525             Patient Care Team:  Manuel Evans MD as PCP - General (Family Medicine)  Lexis Wilkerson APRN as Nurse Practitioner (Obstetrics and Gynecology)    Chief complaint pelvic cramping, miscarriage       HPI: This is a 29-year-old  6 para 2-2-2-4 at 10 weeks and 1 day by LMP and first trimester ultrasound.  Had severe onset cramping last night followed by active heavy bleeding and passage of large amount of tissue including a miscarriage.  Patient has previously miscarried before.  Bleeding  slowed slightly but cramping persisted into this morning when she presented to the ED with cramping.  ED evaluated the patient.  Small amount of bleeding from the cervical os.  Patient had refused pelvic ultrasound.  She is Rh+.  Hemoglobin is 12.2 but white count was 23,000.  Admitted for observation due to elevated white blood cell count.    ROS:   Constitutional: Negative for appetite change, fever and unexpected weight change.   Respiratory: Negative for cough and shortness of breath.    Cardiovascular: Negative for chest pain and palpitations.   Gastrointestinal: Negative for abdominal distention, abdominal pain, constipation, diarrhea, nausea and vomiting.   Endocrine: Negative.    Genitourinary: Negative for dyspareunia, menstrual problem is present (see HPI, pelvic pain is present in the form of cramping and vaginal discharge other than bleeding.   Skin: Negative.    Neurological: Negative for dizziness and headaches.   Hematological: Negative.    Psychiatric/Behavioral: Negative for dysphoric mood and sleep disturbance. The patient is not nervous/anxious.        PMH:   Past Medical History:   Diagnosis Date    ADHD     Anxiety and depression     Bipolar affective disorder     Crohn's disease     History of hepatitis C     PTSD (post-traumatic stress disorder)          PSH:   Past Surgical History:   Procedure Laterality Date    ANKLE SURGERY Left 2019    MANDIBLE FRACTURE SURGERY  08/2024    surgery x 2; metal plates       SoHx:   Social History     Socioeconomic History    Marital status: Single   Tobacco Use    Smoking status: Every Day     Types: Cigarettes    Smokeless tobacco: Never   Vaping Use    Vaping status: Every Day    Substances: Nicotine, THC, Flavoring   Substance and Sexual Activity    Alcohol use: Yes     Comment: former alcoholic- last use 2 months ago    Drug use: Yes     Types: Marijuana     Comment: former meth; last use a couple years ago    Sexual activity: Yes     Partners: Male        FHx:   Family History   Problem Relation Age of Onset    Asthma Mother     Hearing loss Mother        PGyn Hx: See HPI    POBHx: Deferred    Allergies: Cat dander    Medications:   No current facility-administered medications on file prior to encounter.     Current Outpatient Medications on File Prior to Encounter   Medication Sig Dispense Refill    busPIRone (BUSPAR) 10 MG tablet Take 1 tablet by mouth Every 12 (Twelve) Hours.      chlorhexidine (PERIDEX) 0.12 % solution       cyclobenzaprine (FLEXERIL) 10 MG tablet Take 1 tablet by mouth.      FLUoxetine (PROzac) 20 MG capsule Take 1 capsule by mouth Daily.      FLUoxetine (PROzac) 40 MG capsule Take 1 capsule by mouth Daily.      folic acid (FOLVITE) 1 MG tablet Take 1 tablet by mouth Daily. 90 tablet 3    melatonin 5 MG tablet tablet       ondansetron (ZOFRAN) 4 MG tablet       ondansetron ODT (ZOFRAN-ODT) 4 MG disintegrating tablet Place 1 tablet on the tongue Every 8 (Eight) Hours As Needed for Nausea or Vomiting. 30 tablet 2    Prenatal 28-0.8 MG tablet Take 1 tablet by mouth Daily. 30 each 6    albuterol sulfate  (90 Base) MCG/ACT inhaler Inhale 2 puffs Every 4 (Four) Hours As Needed for Wheezing. 8 g 0    amphetamine-dextroamphetamine (ADDERALL) 20 MG tablet TAKE 1 TABLET BY MOUTH TWICE DAILY FOR ADHD SYMPTOMS      aspirin 81 MG EC tablet Take 1 tablet by mouth Daily. 30 tablet 6    fluorometholone (FML) 0.1 % ophthalmic suspension INSTILL 1 DROP INTO RIGHT EYE 4 TIMES DAILY AS DIRECTED FOR 7 DAYS      gabapentin (NEURONTIN) 300 MG capsule Take 1 capsule by mouth 3 times a day. (Patient not taking: Reported on 2/5/2025)      hydrOXYzine (ATARAX) 25 MG tablet  (Patient not taking: Reported on 2/5/2025)      venlafaxine XR (Effexor XR) 37.5 MG 24 hr capsule Take  by mouth. (Patient not taking: Reported on 2/5/2025)      Ventolin  (90 Base) MCG/ACT inhaler       Vraylar 3 MG capsule capsule Take 1 capsule by mouth Daily. (Patient not  taking: Reported on 2/5/2025)               Vital Signs  Temp:  [98.8 °F (37.1 °C)-98.9 °F (37.2 °C)] 98.9 °F (37.2 °C)  Heart Rate:  [100-115] 100  Resp:  [16-18] 16  BP: (114-134)/(65-81) 134/81    Physical Exam:  Constitutional: She is oriented to person, place, and time. She appears well-developed and well-nourished.   HENT:   Head: Normocephalic and atraumatic.   Cardiovascular: Normal rate and regular rhythm.    Pulmonary/Chest: Effort normal. No respiratory distress.   Abdominal: Soft. Bowel sounds are normal. She exhibits no distension and no mass. There is no tenderness. There is no rebound and no guarding.   Musculoskeletal: Normal range of motion.   Neurological: She is alert and oriented to person, place, and time.   Skin: Skin is warm and dry.   Psychiatric: She has a normal mood and affect. Her behavior is normal. Judgment and thought content normal.   Nursing note and vitals reviewed.  Debilities/Disabilities Identified: None  Emotional Behavior: Appropriate    Labs: Rh+, normal hemoglobin      Assessment/Plan: Completed SAB    Patient has declined ultrasound.  Suspect completed SAB.  Bleeding has decreased and physical exam uterus is not palpable.  No acute abdominal signs.  Observe overnight for fever, vital signs and pad count.  Would recommend repeat ultrasound to confirm empty uterus.  Repeat CBC in the a.m.  Patient and significant other agreeable with plan.    I discussed the patients findings and my recommendations with patient and family.     José Miguel Stevens MD  02/07/25  15:25 EST        Electronically signed by José Miguel Stevens MD at 02/07/25 1528       Facility-Administered Medications as of 2/8/2025   Medication Dose Route Frequency Provider Last Rate Last Admin    [COMPLETED] HYDROcodone-acetaminophen (NORCO) 5-325 MG per tablet 1 tablet  1 tablet Oral Once José Miguel Stevens MD   1 tablet at 02/07/25 1552    [COMPLETED] ketorolac (TORADOL) injection 30 mg  30  mg Intramuscular Once Scott Childers MD   30 mg at 02/07/25 1052     Lab Results (last 72 hours)       Procedure Component Value Units Date/Time    CBC & Differential [[id number]]  (Abnormal) Collected: 02/08/25 0439    Specimen: Blood Updated: 02/08/25 0446    Narrative:      The following orders were created for panel order CBC & Differential.  Procedure                               Abnormality         Status                     ---------                               -----------         ------                     CBC Auto Differential[[id number]]        Abnormal            Final result                 Please view results for these tests on the individual orders.    CBC Auto Differential [[id number]]  (Abnormal) Collected: 02/08/25 0439    Specimen: Blood Updated: 02/08/25 0446     WBC 11.41 10*3/mm3      RBC 3.39 10*6/mm3      Hemoglobin 10.9 g/dL      Hematocrit 33.3 %      MCV 98.2 fL      MCH 32.2 pg      MCHC 32.7 g/dL      RDW 13.2 %      RDW-SD 47.0 fl      MPV 10.6 fL      Platelets 178 10*3/mm3      Neutrophil % 76.1 %      Lymphocyte % 15.6 %      Monocyte % 5.4 %      Eosinophil % 2.1 %      Basophil % 0.4 %      Immature Grans % 0.4 %      Neutrophils, Absolute 8.68 10*3/mm3      Lymphocytes, Absolute 1.78 10*3/mm3      Monocytes, Absolute 0.62 10*3/mm3      Eosinophils, Absolute 0.24 10*3/mm3      Basophils, Absolute 0.05 10*3/mm3      Immature Grans, Absolute 0.04 10*3/mm3      nRBC 0.0 /100 WBC     [place] Urine Culture Tube - Urine, Clean Catch [[id number]] Collected: 02/07/25 1214    Specimen: Urine, Clean Catch Updated: 02/07/25 1304     Extra Tube Hold for add-ons.     Comment: Auto resulted.       Urinalysis, Microscopic Only - Urine, Clean Catch [[id number]]  (Abnormal) Collected: 02/07/25 1214    Specimen: Urine, Clean Catch Updated: 02/07/25 1245     RBC, UA 21-50 /HPF      WBC, UA 6-10 /HPF      Bacteria, UA 4+ /HPF      Squamous Epithelial Cells, UA 0-2 /HPF      Hyaline Casts, UA None  Seen /LPF      Methodology Manual Light Microscopy    Urinalysis With Microscopic If Indicated (No Culture) - Urine, Clean Catch [[id number]]  (Abnormal) Collected: 02/07/25 1214    Specimen: Urine, Clean Catch Updated: 02/07/25 1230     Color, UA Yellow     Appearance, UA Clear     pH, UA 6.0     Specific Gravity, UA 1.010     Glucose, UA Negative     Ketones, UA Negative     Bilirubin, UA Negative     Blood, UA Large (3+)     Protein, UA Negative     Leuk Esterase, UA Moderate (2+)     Nitrite, UA Negative     Urobilinogen, UA 0.2 E.U./dL    Chlamydia trachomatis, Neisseria gonorrhoeae, PCR w/ confirmation - Urine, Urine, Clean Catch [[id number]] Collected: 02/07/25 1214    Specimen: Urine, Clean Catch Updated: 02/07/25 1217    Comprehensive Metabolic Panel [[id number]]  (Abnormal) Collected: 02/07/25 1105    Specimen: Blood Updated: 02/07/25 1138     Glucose 141 mg/dL      BUN 4 mg/dL      Creatinine 0.50 mg/dL      Sodium 130 mmol/L      Potassium 3.3 mmol/L      Chloride 96 mmol/L      CO2 19.6 mmol/L      Calcium 9.2 mg/dL      Total Protein 7.3 g/dL      Albumin 4.2 g/dL      ALT (SGPT) 31 U/L      AST (SGOT) 18 U/L      Alkaline Phosphatase 91 U/L      Total Bilirubin 0.8 mg/dL      Globulin 3.1 gm/dL      A/G Ratio 1.4 g/dL      BUN/Creatinine Ratio 8.0     Anion Gap 14.4 mmol/L      eGFR 131.2 mL/min/1.73     Narrative:      GFR Categories in Chronic Kidney Disease (CKD)      GFR Category          GFR (mL/min/1.73)    Interpretation  G1                     90 or greater         Normal or high (1)  G2                      60-89                Mild decrease (1)  G3a                   45-59                Mild to moderate decrease  G3b                   30-44                Moderate to severe decrease  G4                    15-29                Severe decrease  G5                    14 or less           Kidney failure          (1)In the absence of evidence of kidney disease, neither GFR category G1 or G2 fulfill  the criteria for CKD.    eGFR calculation 2021 CKD-EPI creatinine equation, which does not include race as a factor    CBC & Differential [[id number]]  (Abnormal) Collected: 02/07/25 1105    Specimen: Blood Updated: 02/07/25 1119    Narrative:      The following orders were created for panel order CBC & Differential.  Procedure                               Abnormality         Status                     ---------                               -----------         ------                     CBC Auto Differential[[id number]]        Abnormal            Final result                 Please view results for these tests on the individual orders.    CBC Auto Differential [[id number]]  (Abnormal) Collected: 02/07/25 1105    Specimen: Blood Updated: 02/07/25 1119     WBC 23.34 10*3/mm3      RBC 3.73 10*6/mm3      Hemoglobin 12.2 g/dL      Hematocrit 36.9 %      MCV 98.9 fL      MCH 32.7 pg      MCHC 33.1 g/dL      RDW 12.7 %      RDW-SD 45.8 fl      MPV 10.4 fL      Platelets 229 10*3/mm3      Neutrophil % 89.6 %      Lymphocyte % 6.8 %      Monocyte % 3.0 %      Eosinophil % 0.3 %      Basophil % 0.1 %      Immature Grans % 0.2 %      Neutrophils, Absolute 20.89 10*3/mm3      Lymphocytes, Absolute 1.58 10*3/mm3      Monocytes, Absolute 0.71 10*3/mm3      Eosinophils, Absolute 0.08 10*3/mm3      Basophils, Absolute 0.03 10*3/mm3      Immature Grans, Absolute 0.05 10*3/mm3           Imaging Results (Last 72 Hours)       ** No results found for the last 72 hours. **          ECG/EMG Results (last 72 hours)       ** No results found for the last 72 hours. **          Orders (last 72 hrs)        Start     Ordered    02/08/25 0850  Discontinue IV  Once,   Status:  Canceled         02/08/25 0850    02/08/25 0849  Discharge patient  Once         02/08/25 0850    02/08/25 0600  CBC & Differential  Morning Draw         02/08/25 0102    02/08/25 0600  CBC Auto Differential  PROCEDURE ONCE         02/08/25 0102    02/08/25 0000  ibuprofen  (ADVIL,MOTRIN) 600 MG tablet  Every 6 Hours PRN         02/08/25 0850    02/08/25 0000  Discharge Follow-up with Specialty: [facility]; 1 Week         02/08/25 0850    02/07/25 2100  guaiFENesin (MUCINEX) 12 hr tablet 600 mg  Every 12 Hours Scheduled,   Status:  Discontinued         02/07/25 1543    02/07/25 1630  HYDROcodone-acetaminophen (NORCO) 5-325 MG per tablet 1 tablet  Once         02/07/25 1543    02/07/25 1600  Vital Signs  Every 4 Hours,   Status:  Canceled       02/07/25 1244    02/07/25 1530  CBC (No Diff)  Once,   Status:  Canceled         02/07/25 1529    02/07/25 1329  Tissue Pathology Exam  Once         02/07/25 1329    02/07/25 1315  ondansetron ODT (ZOFRAN-ODT) disintegrating tablet 4 mg  Every 6 Hours PRN,   Status:  Discontinued         02/07/25 1315    02/07/25 1246  [place] Urine Culture Tube - Urine, Clean Catch  Once         02/07/25 1245    02/07/25 1244  Pad count  Nursing Communication  Once,   Status:  Canceled        Comments: Pad count    02/07/25 1244    02/07/25 1243  ibuprofen (ADVIL,MOTRIN) tablet 600 mg  Every 6 Hours PRN,   Status:  Discontinued         02/07/25 1244    02/07/25 1243  HYDROcodone-acetaminophen (NORCO) 5-325 MG per tablet 1 tablet  Every 4 Hours PRN,   Status:  Discontinued         02/07/25 1244    02/07/25 1235  Diet: Regular/House; Fluid Consistency: Thin (IDDSI 0)  Diet Effective Now,   Status:  Canceled         02/07/25 1234    02/07/25 1231  Urinalysis, Microscopic Only - Urine, Clean Catch  Once         02/07/25 1230    02/07/25 1132  Initiate Observation Status  Once         02/07/25 1132                  Operative/Procedure Notes (last 72 hours)  Notes from 02/07/25 1119 through 02/10/25 1119   No notes of this type exist for this encounter.       Physician Progress Notes (last 72 hours)  Notes from 02/07/25 1119 through 02/10/25 1119   No notes of this type exist for this encounter.       Consult Notes (last 72 hours)  Notes from 02/07/25 1119 through 02/10/25  1119   No notes of this type exist for this encounter.

## 2025-02-10 NOTE — CASE MANAGEMENT/SOCIAL WORK
Case Management Discharge Note      Final Note: dc home         Selected Continued Care - Discharged on 2/8/2025 Admission date: 2/7/2025 - Discharge disposition: Home or Self Care      Destination    No services have been selected for the patient.                Durable Medical Equipment    No services have been selected for the patient.                Dialysis/Infusion    No services have been selected for the patient.                Home Medical Care    No services have been selected for the patient.                Therapy    No services have been selected for the patient.                Community Resources    No services have been selected for the patient.                Community & DME    No services have been selected for the patient.                         Final Discharge Disposition Code: 01 - home or self-care

## 2025-02-11 ENCOUNTER — PREP FOR SURGERY (OUTPATIENT)
Dept: OTHER | Facility: HOSPITAL | Age: 30
End: 2025-02-11
Payer: COMMERCIAL

## 2025-02-11 ENCOUNTER — ANESTHESIA (OUTPATIENT)
Dept: PERIOP | Facility: HOSPITAL | Age: 30
End: 2025-02-11
Payer: COMMERCIAL

## 2025-02-11 ENCOUNTER — ANESTHESIA EVENT (OUTPATIENT)
Dept: PERIOP | Facility: HOSPITAL | Age: 30
End: 2025-02-11
Payer: COMMERCIAL

## 2025-02-11 ENCOUNTER — HOSPITAL ENCOUNTER (OUTPATIENT)
Facility: HOSPITAL | Age: 30
Discharge: HOME | End: 2025-02-12
Attending: OBSTETRICS & GYNECOLOGY | Admitting: OBSTETRICS & GYNECOLOGY
Payer: COMMERCIAL

## 2025-02-11 ENCOUNTER — OFFICE VISIT (OUTPATIENT)
Dept: OBSTETRICS AND GYNECOLOGY | Facility: CLINIC | Age: 30
End: 2025-02-11
Payer: COMMERCIAL

## 2025-02-11 VITALS
WEIGHT: 246 LBS | DIASTOLIC BLOOD PRESSURE: 98 MMHG | HEIGHT: 65 IN | SYSTOLIC BLOOD PRESSURE: 180 MMHG | BODY MASS INDEX: 40.98 KG/M2

## 2025-02-11 DIAGNOSIS — O23.41 URINARY TRACT INFECTION IN MOTHER DURING FIRST TRIMESTER OF PREGNANCY: ICD-10-CM

## 2025-02-11 DIAGNOSIS — O02.1 ABORTION, MISSED: ICD-10-CM

## 2025-02-11 DIAGNOSIS — N96 RECURRENT PREGNANCY LOSS: ICD-10-CM

## 2025-02-11 DIAGNOSIS — O03.4 RETAINED PRODUCTS OF CONCEPTION AFTER MISCARRIAGE: Primary | ICD-10-CM

## 2025-02-11 DIAGNOSIS — O03.4 INCOMPLETE MISCARRIAGE: Primary | ICD-10-CM

## 2025-02-11 LAB
ABO GROUP BLD: NORMAL
ALBUMIN SERPL-MCNC: 3.9 G/DL (ref 3.5–5.2)
ALBUMIN/GLOB SERPL: 1.1 G/DL
ALP SERPL-CCNC: 117 U/L (ref 39–117)
ALT SERPL W P-5'-P-CCNC: 32 U/L (ref 1–33)
ANION GAP SERPL CALCULATED.3IONS-SCNC: 13.3 MMOL/L (ref 5–15)
AST SERPL-CCNC: 15 U/L (ref 1–32)
BACTERIA UR QL AUTO: ABNORMAL /HPF
BASOPHILS # BLD AUTO: 0.05 10*3/MM3 (ref 0–0.2)
BASOPHILS NFR BLD AUTO: 0.3 % (ref 0–1.5)
BILIRUB SERPL-MCNC: 0.7 MG/DL (ref 0–1.2)
BILIRUB UR QL STRIP: ABNORMAL
BLD GP AB SCN SERPL QL: NEGATIVE
BUN SERPL-MCNC: 8 MG/DL (ref 6–20)
BUN/CREAT SERPL: 13.1 (ref 7–25)
CALCIUM SPEC-SCNC: 9 MG/DL (ref 8.6–10.5)
CHLORIDE SERPL-SCNC: 96 MMOL/L (ref 98–107)
CLARITY UR: CLEAR
CO2 SERPL-SCNC: 23.7 MMOL/L (ref 22–29)
COLOR UR: ABNORMAL
CREAT SERPL-MCNC: 0.61 MG/DL (ref 0.57–1)
D-LACTATE SERPL-SCNC: 1 MMOL/L (ref 0.5–2)
DEPRECATED RDW RBC AUTO: 44.6 FL (ref 37–54)
EGFRCR SERPLBLD CKD-EPI 2021: 125.1 ML/MIN/1.73
EOSINOPHIL # BLD AUTO: 0.04 10*3/MM3 (ref 0–0.4)
EOSINOPHIL NFR BLD AUTO: 0.3 % (ref 0.3–6.2)
ERYTHROCYTE [DISTWIDTH] IN BLOOD BY AUTOMATED COUNT: 12.5 % (ref 12.3–15.4)
GLOBULIN UR ELPH-MCNC: 3.6 GM/DL
GLUCOSE SERPL-MCNC: 111 MG/DL (ref 65–99)
GLUCOSE UR STRIP-MCNC: NEGATIVE MG/DL
HCG INTACT+B SERPL-ACNC: 2392 MIU/ML
HCT VFR BLD AUTO: 33.9 % (ref 34–46.6)
HGB BLD-MCNC: 11.2 G/DL (ref 12–15.9)
HGB UR QL STRIP.AUTO: NEGATIVE
HYALINE CASTS UR QL AUTO: ABNORMAL /LPF
IMM GRANULOCYTES # BLD AUTO: 0.1 10*3/MM3 (ref 0–0.05)
IMM GRANULOCYTES NFR BLD AUTO: 0.7 % (ref 0–0.5)
KETONES UR QL STRIP: NEGATIVE
LEUKOCYTE ESTERASE UR QL STRIP.AUTO: ABNORMAL
LYMPHOCYTES # BLD AUTO: 1.15 10*3/MM3 (ref 0.7–3.1)
LYMPHOCYTES NFR BLD AUTO: 7.9 % (ref 19.6–45.3)
MCH RBC QN AUTO: 31.9 PG (ref 26.6–33)
MCHC RBC AUTO-ENTMCNC: 33 G/DL (ref 31.5–35.7)
MCV RBC AUTO: 96.6 FL (ref 79–97)
MONOCYTES # BLD AUTO: 1.46 10*3/MM3 (ref 0.1–0.9)
MONOCYTES NFR BLD AUTO: 10 % (ref 5–12)
NEUTROPHILS NFR BLD AUTO: 11.82 10*3/MM3 (ref 1.7–7)
NEUTROPHILS NFR BLD AUTO: 80.8 % (ref 42.7–76)
NITRITE UR QL STRIP: NEGATIVE
NRBC BLD AUTO-RTO: 0 /100 WBC (ref 0–0.2)
PH UR STRIP.AUTO: 6 [PH] (ref 4.5–8)
PLATELET # BLD AUTO: 225 10*3/MM3 (ref 140–450)
PMV BLD AUTO: 10.1 FL (ref 6–12)
POTASSIUM SERPL-SCNC: 3.6 MMOL/L (ref 3.5–5.2)
PROT SERPL-MCNC: 7.5 G/DL (ref 6–8.5)
PROT UR QL STRIP: ABNORMAL
RBC # BLD AUTO: 3.51 10*6/MM3 (ref 3.77–5.28)
RBC # UR STRIP: ABNORMAL /HPF
REF LAB TEST METHOD: ABNORMAL
RH BLD: POSITIVE
SODIUM SERPL-SCNC: 133 MMOL/L (ref 136–145)
SP GR UR STRIP: 1.02 (ref 1–1.03)
SQUAMOUS #/AREA URNS HPF: ABNORMAL /HPF
T&S EXPIRATION DATE: NORMAL
UROBILINOGEN UR QL STRIP: ABNORMAL
WBC # UR STRIP: ABNORMAL /HPF
WBC NRBC COR # BLD AUTO: 14.62 10*3/MM3 (ref 3.4–10.8)

## 2025-02-11 PROCEDURE — 25010000002 CEFOXITIN PER 1 G: Performed by: NURSE PRACTITIONER

## 2025-02-11 PROCEDURE — 86901 BLOOD TYPING SEROLOGIC RH(D): CPT | Performed by: OBSTETRICS & GYNECOLOGY

## 2025-02-11 PROCEDURE — 25010000002 ONDANSETRON PER 1 MG

## 2025-02-11 PROCEDURE — 25010000002 PHENYLEPHRINE 10 MG/ML SOLUTION

## 2025-02-11 PROCEDURE — 25010000002 CEFOXITIN PER 1 G: Performed by: OBSTETRICS & GYNECOLOGY

## 2025-02-11 PROCEDURE — 87086 URINE CULTURE/COLONY COUNT: CPT | Performed by: NURSE PRACTITIONER

## 2025-02-11 PROCEDURE — 25010000002 FENTANYL CITRATE (PF) 50 MCG/ML SOLUTION

## 2025-02-11 PROCEDURE — 36415 COLL VENOUS BLD VENIPUNCTURE: CPT | Performed by: NURSE PRACTITIONER

## 2025-02-11 PROCEDURE — 96361 HYDRATE IV INFUSION ADD-ON: CPT

## 2025-02-11 PROCEDURE — 25810000003 LACTATED RINGERS PER 1000 ML: Performed by: NURSE PRACTITIONER

## 2025-02-11 PROCEDURE — 85025 COMPLETE CBC W/AUTO DIFF WBC: CPT | Performed by: NURSE PRACTITIONER

## 2025-02-11 PROCEDURE — 25010000002 LIDOCAINE 2% SOLUTION

## 2025-02-11 PROCEDURE — 25010000002 MIDAZOLAM PER 1MG

## 2025-02-11 PROCEDURE — 96374 THER/PROPH/DIAG INJ IV PUSH: CPT

## 2025-02-11 PROCEDURE — 25010000002 PROPOFOL 1000 MG/100ML EMULSION

## 2025-02-11 PROCEDURE — 88305 TISSUE EXAM BY PATHOLOGIST: CPT | Performed by: OBSTETRICS & GYNECOLOGY

## 2025-02-11 PROCEDURE — 25010000002 DEXAMETHASONE PER 1 MG

## 2025-02-11 PROCEDURE — 25010000002 PROPOFOL 10 MG/ML EMULSION

## 2025-02-11 PROCEDURE — 87088 URINE BACTERIA CULTURE: CPT | Performed by: NURSE PRACTITIONER

## 2025-02-11 PROCEDURE — 84702 CHORIONIC GONADOTROPIN TEST: CPT | Performed by: NURSE PRACTITIONER

## 2025-02-11 PROCEDURE — 86850 RBC ANTIBODY SCREEN: CPT | Performed by: OBSTETRICS & GYNECOLOGY

## 2025-02-11 PROCEDURE — 87186 SC STD MICRODIL/AGAR DIL: CPT | Performed by: NURSE PRACTITIONER

## 2025-02-11 PROCEDURE — 81001 URINALYSIS AUTO W/SCOPE: CPT | Performed by: NURSE PRACTITIONER

## 2025-02-11 PROCEDURE — G0378 HOSPITAL OBSERVATION PER HR: HCPCS

## 2025-02-11 PROCEDURE — 80053 COMPREHEN METABOLIC PANEL: CPT | Performed by: NURSE PRACTITIONER

## 2025-02-11 PROCEDURE — 25010000002 HYDROMORPHONE 1 MG/ML SOLUTION

## 2025-02-11 PROCEDURE — 87040 BLOOD CULTURE FOR BACTERIA: CPT | Performed by: NURSE PRACTITIONER

## 2025-02-11 PROCEDURE — 86900 BLOOD TYPING SEROLOGIC ABO: CPT | Performed by: OBSTETRICS & GYNECOLOGY

## 2025-02-11 PROCEDURE — 83605 ASSAY OF LACTIC ACID: CPT | Performed by: OBSTETRICS & GYNECOLOGY

## 2025-02-11 PROCEDURE — 25010000002 HYDROMORPHONE 1 MG/ML SOLUTION: Performed by: OBSTETRICS & GYNECOLOGY

## 2025-02-11 RX ORDER — GABAPENTIN 300 MG/1
300 CAPSULE ORAL 3 TIMES DAILY
Status: DISCONTINUED | OUTPATIENT
Start: 2025-02-11 | End: 2025-02-12 | Stop reason: HOSPADM

## 2025-02-11 RX ORDER — SODIUM CHLORIDE 0.9 % (FLUSH) 0.9 %
10 SYRINGE (ML) INJECTION AS NEEDED
Status: DISCONTINUED | OUTPATIENT
Start: 2025-02-11 | End: 2025-02-11

## 2025-02-11 RX ORDER — SODIUM CHLORIDE 0.9 % (FLUSH) 0.9 %
10 SYRINGE (ML) INJECTION EVERY 12 HOURS SCHEDULED
Status: CANCELLED | OUTPATIENT
Start: 2025-02-11

## 2025-02-11 RX ORDER — LIDOCAINE HYDROCHLORIDE 10 MG/ML
0.5 INJECTION, SOLUTION EPIDURAL; INFILTRATION; INTRACAUDAL; PERINEURAL ONCE AS NEEDED
Status: DISCONTINUED | OUTPATIENT
Start: 2025-02-11 | End: 2025-02-11

## 2025-02-11 RX ORDER — ONDANSETRON 2 MG/ML
4 INJECTION INTRAMUSCULAR; INTRAVENOUS ONCE AS NEEDED
Status: DISCONTINUED | OUTPATIENT
Start: 2025-02-11 | End: 2025-02-11 | Stop reason: HOSPADM

## 2025-02-11 RX ORDER — PHENYLEPHRINE HYDROCHLORIDE 10 MG/ML
INJECTION INTRAVENOUS AS NEEDED
Status: DISCONTINUED | OUTPATIENT
Start: 2025-02-11 | End: 2025-02-11 | Stop reason: SURG

## 2025-02-11 RX ORDER — SODIUM CHLORIDE 9 MG/ML
40 INJECTION, SOLUTION INTRAVENOUS AS NEEDED
Status: DISCONTINUED | OUTPATIENT
Start: 2025-02-11 | End: 2025-02-11

## 2025-02-11 RX ORDER — SODIUM CHLORIDE 0.9 % (FLUSH) 0.9 %
10 SYRINGE (ML) INJECTION EVERY 12 HOURS SCHEDULED
Status: DISCONTINUED | OUTPATIENT
Start: 2025-02-11 | End: 2025-02-11

## 2025-02-11 RX ORDER — NITROFURANTOIN 25; 75 MG/1; MG/1
100 CAPSULE ORAL 2 TIMES DAILY
Qty: 14 CAPSULE | Refills: 0 | Status: SHIPPED | OUTPATIENT
Start: 2025-02-11 | End: 2025-02-18

## 2025-02-11 RX ORDER — MIDAZOLAM HYDROCHLORIDE 2 MG/2ML
INJECTION, SOLUTION INTRAMUSCULAR; INTRAVENOUS AS NEEDED
Status: DISCONTINUED | OUTPATIENT
Start: 2025-02-11 | End: 2025-02-11 | Stop reason: SURG

## 2025-02-11 RX ORDER — FAMOTIDINE 10 MG/ML
20 INJECTION, SOLUTION INTRAVENOUS
Status: COMPLETED | OUTPATIENT
Start: 2025-02-11 | End: 2025-02-11

## 2025-02-11 RX ORDER — ONDANSETRON 2 MG/ML
4 INJECTION INTRAMUSCULAR; INTRAVENOUS ONCE AS NEEDED
Status: COMPLETED | OUTPATIENT
Start: 2025-02-11 | End: 2025-02-11

## 2025-02-11 RX ORDER — GUAIFENESIN 600 MG/1
600 TABLET, EXTENDED RELEASE ORAL EVERY 12 HOURS SCHEDULED
Status: DISCONTINUED | OUTPATIENT
Start: 2025-02-11 | End: 2025-02-12 | Stop reason: HOSPADM

## 2025-02-11 RX ORDER — SODIUM CHLORIDE 0.9 % (FLUSH) 0.9 %
10 SYRINGE (ML) INJECTION EVERY 12 HOURS SCHEDULED
Status: DISCONTINUED | OUTPATIENT
Start: 2025-02-11 | End: 2025-02-12 | Stop reason: HOSPADM

## 2025-02-11 RX ORDER — PROPOFOL 10 MG/ML
INJECTION, EMULSION INTRAVENOUS AS NEEDED
Status: DISCONTINUED | OUTPATIENT
Start: 2025-02-11 | End: 2025-02-11 | Stop reason: SURG

## 2025-02-11 RX ORDER — SODIUM CHLORIDE, SODIUM LACTATE, POTASSIUM CHLORIDE, CALCIUM CHLORIDE 600; 310; 30; 20 MG/100ML; MG/100ML; MG/100ML; MG/100ML
125 INJECTION, SOLUTION INTRAVENOUS CONTINUOUS
Status: ACTIVE | OUTPATIENT
Start: 2025-02-11 | End: 2025-02-11

## 2025-02-11 RX ORDER — SODIUM CHLORIDE, SODIUM LACTATE, POTASSIUM CHLORIDE, CALCIUM CHLORIDE 600; 310; 30; 20 MG/100ML; MG/100ML; MG/100ML; MG/100ML
9 INJECTION, SOLUTION INTRAVENOUS CONTINUOUS
Status: DISCONTINUED | OUTPATIENT
Start: 2025-02-11 | End: 2025-02-12 | Stop reason: HOSPADM

## 2025-02-11 RX ORDER — SODIUM CHLORIDE 0.9 % (FLUSH) 0.9 %
10 SYRINGE (ML) INJECTION AS NEEDED
Status: CANCELLED | OUTPATIENT
Start: 2025-02-11

## 2025-02-11 RX ORDER — FENTANYL CITRATE 50 UG/ML
INJECTION, SOLUTION INTRAMUSCULAR; INTRAVENOUS AS NEEDED
Status: DISCONTINUED | OUTPATIENT
Start: 2025-02-11 | End: 2025-02-11 | Stop reason: SURG

## 2025-02-11 RX ORDER — MIDAZOLAM HYDROCHLORIDE 2 MG/2ML
2 INJECTION, SOLUTION INTRAMUSCULAR; INTRAVENOUS ONCE
Status: COMPLETED | OUTPATIENT
Start: 2025-02-11 | End: 2025-02-11

## 2025-02-11 RX ORDER — GABAPENTIN 300 MG/1
300 CAPSULE ORAL 3 TIMES DAILY
Status: DISCONTINUED | OUTPATIENT
Start: 2025-02-11 | End: 2025-02-11

## 2025-02-11 RX ORDER — ALBUTEROL SULFATE 0.83 MG/ML
2.5 SOLUTION RESPIRATORY (INHALATION) EVERY 6 HOURS PRN
Status: DISCONTINUED | OUTPATIENT
Start: 2025-02-11 | End: 2025-02-12 | Stop reason: HOSPADM

## 2025-02-11 RX ORDER — ACETAMINOPHEN 325 MG/1
650 TABLET ORAL ONCE
Status: COMPLETED | OUTPATIENT
Start: 2025-02-11 | End: 2025-02-11

## 2025-02-11 RX ORDER — OXYCODONE HYDROCHLORIDE 5 MG/1
5 TABLET ORAL ONCE AS NEEDED
Status: COMPLETED | OUTPATIENT
Start: 2025-02-11 | End: 2025-02-11

## 2025-02-11 RX ORDER — DEXAMETHASONE SODIUM PHOSPHATE 4 MG/ML
8 INJECTION, SOLUTION INTRA-ARTICULAR; INTRALESIONAL; INTRAMUSCULAR; INTRAVENOUS; SOFT TISSUE ONCE AS NEEDED
Status: COMPLETED | OUTPATIENT
Start: 2025-02-11 | End: 2025-02-11

## 2025-02-11 RX ORDER — SODIUM CHLORIDE, SODIUM LACTATE, POTASSIUM CHLORIDE, CALCIUM CHLORIDE 600; 310; 30; 20 MG/100ML; MG/100ML; MG/100ML; MG/100ML
100 INJECTION, SOLUTION INTRAVENOUS ONCE
Status: DISCONTINUED | OUTPATIENT
Start: 2025-02-11 | End: 2025-02-11 | Stop reason: HOSPADM

## 2025-02-11 RX ORDER — SODIUM CHLORIDE 0.9 % (FLUSH) 0.9 %
10 SYRINGE (ML) INJECTION AS NEEDED
Status: DISCONTINUED | OUTPATIENT
Start: 2025-02-11 | End: 2025-02-12 | Stop reason: HOSPADM

## 2025-02-11 RX ORDER — IBUPROFEN 600 MG/1
600 TABLET, FILM COATED ORAL EVERY 6 HOURS PRN
Status: DISCONTINUED | OUTPATIENT
Start: 2025-02-11 | End: 2025-02-12 | Stop reason: HOSPADM

## 2025-02-11 RX ORDER — SODIUM CHLORIDE 9 MG/ML
40 INJECTION, SOLUTION INTRAVENOUS AS NEEDED
Status: CANCELLED | OUTPATIENT
Start: 2025-02-11

## 2025-02-11 RX ORDER — LIDOCAINE HYDROCHLORIDE 20 MG/ML
INJECTION, SOLUTION INFILTRATION; PERINEURAL AS NEEDED
Status: DISCONTINUED | OUTPATIENT
Start: 2025-02-11 | End: 2025-02-11 | Stop reason: SURG

## 2025-02-11 RX ORDER — DEXMEDETOMIDINE HYDROCHLORIDE 100 UG/ML
INJECTION, SOLUTION INTRAVENOUS AS NEEDED
Status: DISCONTINUED | OUTPATIENT
Start: 2025-02-11 | End: 2025-02-11 | Stop reason: SURG

## 2025-02-11 RX ADMIN — CEFOXITIN 2000 MG: 2 INJECTION, POWDER, FOR SOLUTION INTRAVENOUS at 13:07

## 2025-02-11 RX ADMIN — PHENYLEPHRINE HYDROCHLORIDE 50 MCG: 10 INJECTION INTRAVENOUS at 15:19

## 2025-02-11 RX ADMIN — PROPOFOL INJECTABLE EMULSION 100 MCG/KG/MIN: 10 INJECTION, EMULSION INTRAVENOUS at 15:05

## 2025-02-11 RX ADMIN — LIDOCAINE HYDROCHLORIDE 40 MG: 20 INJECTION, SOLUTION INFILTRATION; PERINEURAL at 15:05

## 2025-02-11 RX ADMIN — GABAPENTIN 300 MG: 300 CAPSULE ORAL at 20:01

## 2025-02-11 RX ADMIN — HYDROMORPHONE HYDROCHLORIDE 1 MG: 1 INJECTION, SOLUTION INTRAMUSCULAR; INTRAVENOUS; SUBCUTANEOUS at 13:34

## 2025-02-11 RX ADMIN — GUAIFENESIN 600 MG: 600 TABLET, EXTENDED RELEASE ORAL at 20:01

## 2025-02-11 RX ADMIN — ONDANSETRON 4 MG: 2 INJECTION INTRAMUSCULAR; INTRAVENOUS at 14:54

## 2025-02-11 RX ADMIN — MIDAZOLAM HYDROCHLORIDE 2 MG: 1 INJECTION, SOLUTION INTRAMUSCULAR; INTRAVENOUS at 14:55

## 2025-02-11 RX ADMIN — PROPOFOL INJECTABLE EMULSION 50 MG: 10 INJECTION, EMULSION INTRAVENOUS at 15:05

## 2025-02-11 RX ADMIN — DEXMEDETOMIDINE HYDROCHLORIDE 8 MCG: 100 INJECTION, SOLUTION INTRAVENOUS at 15:02

## 2025-02-11 RX ADMIN — HYDROMORPHONE HYDROCHLORIDE 0.5 MG: 1 INJECTION, SOLUTION INTRAMUSCULAR; INTRAVENOUS; SUBCUTANEOUS at 15:28

## 2025-02-11 RX ADMIN — MIDAZOLAM HYDROCHLORIDE 1 MG: 1 INJECTION, SOLUTION INTRAMUSCULAR; INTRAVENOUS at 15:02

## 2025-02-11 RX ADMIN — PHENYLEPHRINE HYDROCHLORIDE 130 MCG: 10 INJECTION INTRAVENOUS at 15:33

## 2025-02-11 RX ADMIN — PHENYLEPHRINE HYDROCHLORIDE 100 MCG: 10 INJECTION INTRAVENOUS at 15:38

## 2025-02-11 RX ADMIN — DEXAMETHASONE SODIUM PHOSPHATE 8 MG: 4 INJECTION, SOLUTION INTRAMUSCULAR; INTRAVENOUS at 14:53

## 2025-02-11 RX ADMIN — ACETAMINOPHEN 650 MG: 325 TABLET ORAL at 16:16

## 2025-02-11 RX ADMIN — OXYCODONE HYDROCHLORIDE 5 MG: 5 TABLET ORAL at 16:25

## 2025-02-11 RX ADMIN — PROPOFOL INJECTABLE EMULSION 100 MG: 10 INJECTION, EMULSION INTRAVENOUS at 15:10

## 2025-02-11 RX ADMIN — DOXYCYCLINE 100 MG: 100 INJECTION, POWDER, LYOPHILIZED, FOR SOLUTION INTRAVENOUS at 14:31

## 2025-02-11 RX ADMIN — CEFOXITIN 2000 MG: 2 INJECTION, POWDER, FOR SOLUTION INTRAVENOUS at 20:02

## 2025-02-11 RX ADMIN — SODIUM CHLORIDE, POTASSIUM CHLORIDE, SODIUM LACTATE AND CALCIUM CHLORIDE 125 ML/HR: 600; 310; 30; 20 INJECTION, SOLUTION INTRAVENOUS at 14:52

## 2025-02-11 RX ADMIN — DEXMEDETOMIDINE HYDROCHLORIDE 4 MCG: 100 INJECTION, SOLUTION INTRAVENOUS at 15:16

## 2025-02-11 RX ADMIN — SODIUM CHLORIDE, POTASSIUM CHLORIDE, SODIUM LACTATE AND CALCIUM CHLORIDE 125 ML/HR: 600; 310; 30; 20 INJECTION, SOLUTION INTRAVENOUS at 13:00

## 2025-02-11 RX ADMIN — DEXMEDETOMIDINE HYDROCHLORIDE 8 MCG: 100 INJECTION, SOLUTION INTRAVENOUS at 15:06

## 2025-02-11 RX ADMIN — FAMOTIDINE 20 MG: 10 INJECTION INTRAVENOUS at 14:54

## 2025-02-11 RX ADMIN — Medication 10 MG: at 20:01

## 2025-02-11 RX ADMIN — FENTANYL CITRATE 25 MCG: 50 INJECTION, SOLUTION INTRAMUSCULAR; INTRAVENOUS at 15:07

## 2025-02-11 RX ADMIN — IBUPROFEN 600 MG: 600 TABLET, FILM COATED ORAL at 20:01

## 2025-02-11 RX ADMIN — PHENYLEPHRINE HYDROCHLORIDE 100 MCG: 10 INJECTION INTRAVENOUS at 15:16

## 2025-02-11 RX ADMIN — FENTANYL CITRATE 25 MCG: 50 INJECTION, SOLUTION INTRAMUSCULAR; INTRAVENOUS at 15:11

## 2025-02-11 RX ADMIN — PHENYLEPHRINE HYDROCHLORIDE 100 MCG: 10 INJECTION INTRAVENOUS at 15:21

## 2025-02-11 NOTE — PROGRESS NOTES
"Subjective     Chief Complaint   Patient presents with    MAB       Adeel Resendiz is a 29 y.o.  whose LMP is Patient's last menstrual period was 2024.. She presents today for follow up. She was seen over the weekend in the ER for a miscarriage. She was ultimately admitted over night d/t an increased WBC of 23K.  She did not have an US completed at this ER visit. The tissue she passed during her ER visit was sent to pathology showing products of conception with a grossly identifiable  intact fetus and microscopically immature fetal cells.  She reports prior to her ER visit, she started having bleeding on Thursday night. She presented to the ER with c/o bleeding and pain.  Her EDC was established prior of 25 making her approx 10+weeks at this time. Ultimately she was discharged home after being observed overnight. She was afebrile and her WBC improved to 11K. She was examined by Dr. Stevens and rec she follow up in the office on Monday. Since then, she feels terrible. She c/o sweating and having chills. She has not checked her temp. She c/o pain no relieved by ibuprofen, flexeril or gabapentin. She reports pain in her lower pelvis and pain in her back. She is very tearful today. She is accompanied by her partner. She continues to have some bleeding as well.     This was a planned and wanted pregnancy. She has poor reproductive h/o with miscarriage x 3 and loss of twin @ 15 week gestation.     HPI    HPI    The following portions of the patient's history were reviewed and updated as appropriate:vital signs, allergies, current medications, past medical history, past social history, past surgical history, and problem list      Review of Systems     Review of Systems   Constitutional:  Positive for chills and diaphoresis.   Genitourinary:  Positive for pelvic pain and vaginal bleeding.   Musculoskeletal:  Positive for back pain.       Objective      /98   Ht 165.1 cm (65\")   Wt 112 kg (246 lb)  "  LMP 11/28/2024   BMI 40.94 kg/m²     Physical Exam    Physical Exam  Vitals and nursing note reviewed.   Constitutional:       Appearance: Normal appearance.   Abdominal:      Palpations: Abdomen is soft.      Tenderness: There is abdominal tenderness in the right lower quadrant, suprapubic area and left lower quadrant.   Skin:     General: Skin is warm and dry.   Neurological:      General: No focal deficit present.      Mental Status: She is alert and oriented to person, place, and time.   Psychiatric:         Mood and Affect: Mood normal.         Behavior: Behavior normal.         Lab Review   Labs: Labs from initial OB and ER visit      Imaging   Ultrasound - Pelvic Vaginal today. Uterus AV. Large area of mixed echogenicity seen within the uterus. Area measures 6.4 x 3.4cm. Ovaries wnl.     Assessment  There are no diagnoses linked to this encounter.    Additional Assessment:   Miscarriage  UTI   RPL  Elevated BP     Plan      Miscarriage- Rh +.  US today disc with patient and partner. Concern for retained products. Pt feels unwell. Plan to admit to WC for further eval and management.   UTI- ERX sent for macrobid this AM. Uncertain if feels unwell d/t UTI/? Pyelo (back pain) vs miscarriage with retained POC.   RPL- Needs RPL panel   Elevated BP- Pt very upset regarding miscarriage, also feels unwell and is in pain.     Dr. Martínez examined patient as well. Dr. Rodriguez notified of patient.     [facility]    I spent 30 minutes caring for Adeel on this date of service. This time includes time spent by me in the following activities: preparing for the visit, reviewing tests, performing a medically appropriate examination and/or evaluation, counseling and educating the patient/family/caregiver, referring and communicating with other health care professionals, documenting information in the medical record, independently interpreting results and communicating that information with the  patient/family/caregiver, care coordination, and ordering medications      Malathi Johnston, APRN  2/11/2025

## 2025-02-11 NOTE — OP NOTE
Date of Service:  2/11/2025    Time of Service:  15:29 EST     Surgical Staff: Surgeons and Role:     * Diana Rodriguez MD - Primary   Additional Staff: Dr. Robinson Martínez   Pre-operative diagnosis(es): Retained POC     Post-operative diagnosis(es): Retained POC   Procedure(s): Procedure(s):  DILATATION AND CURETTAGE WITH SUCTION     Antibiotics: cefoxitin (Mefoxin) and doxycycline  ordered on call to OR     Anesthesia: Type: Choice  ASA:  III          Operative findings: Retained POC   Specimens removed: A: POC   Fluid Intake: 300 mL   Output: Documented Output  Est. Blood Loss 100 mL  Urine Output 50 mL      No intake/output data recorded.     Blood products used: No   Drains: * No LDAs found *   Implant Information: Nothing was implanted during the procedure   Complications: None apparent   Intraoperative consult(s): none   Condition: stable   Disposition: to PACU and [facility]             After all questions answered the patient was taken to the OR and placed under GETA anesthesia. She was carefully and attentively placed in dorsal lithotomy position. She was prepped and draped in a sterile fashion. A speculum was placed into the vagina and a single tooth tenaculum was placed at the anterior lip of the cervix. The cervical os was noted to be 1cms dilated. A 7 cm curved curette was placed into the uterus and the suction was turned on. The products of conception were removed from the endometrial canal. A serated curette was then placed into the endometrial cavity and it was curretted out till a gritty sensation was felt throughout indicating to me complete evacuation of tissue.  There was no evidence of uterine perforation or cervical laceration. The procedure was deemed completed and all instruments were removed from the vagina. Cytotec 400 mcg was placed rectally. Excellent hemostasis was noted and pt was in stable condition to PACU.  All sponge, needle & instrument counts were  correct x 3. The [facility].             Diana Rodriguez MD  02/11/25  15:29 EST

## 2025-02-11 NOTE — ANESTHESIA POSTPROCEDURE EVALUATION
Patient: Adeel Resendiz    Procedure Summary       Date: 02/11/25 Room / Location:  [facility] OR 2 /  [facility]    Anesthesia Start: 1451 Anesthesia Stop: 1542    Procedure: DILATATION AND CURETTAGE WITH SUCTION (Vagina) Diagnosis:     Surgeons: Diana Rodriguez MD Provider: Kalpana Pa CRNA    Anesthesia Type: MAC ASA Status: 3 - Emergent            Anesthesia Type: MAC    Vitals  Vitals Value Taken Time   /80 02/11/25 1620   Temp 101.8 °F (38.8 °C) 02/11/25 1540   Pulse 116 02/11/25 1626   Resp 17 02/11/25 1615   SpO2 96 % 02/11/25 1626   Vitals shown include unfiled device data.        Post Anesthesia Care and Evaluation    Patient location during evaluation: bedside  Patient participation: complete - patient participated  Level of consciousness: awake and alert  Pain score: 2  Pain management: adequate    Airway patency: patent  Anesthetic complications: No anesthetic complications  PONV Status: none  Cardiovascular status: acceptable  Respiratory status: acceptable  Hydration status: acceptable

## 2025-02-11 NOTE — ANESTHESIA PROCEDURE NOTES
Airway  Urgency: elective    Date/Time: 2/11/2025 3:11 PM  Airway not difficult    General Information and Staff    Patient location during procedure: OR  CRNA/CAA: Kalpana Pa CRNA  SRNA: Nia Aldana SRNA  Indications and Patient Condition  Indications for airway management: airway protection    Preoxygenated: yes  MILS maintained throughout  Mask difficulty assessment: 0 - not attempted    Final Airway Details  Final airway type: supraglottic airway      Successful airway: unique  Size 4     Number of attempts at approach: 1  Assessment: lips, teeth, and gum same as pre-op

## 2025-02-11 NOTE — LETTER
February 12, 2025      [facility]  [address] [place] [address]  [phone]          Patient: Adeel Resendiz   YOB: 1995   Date of Visit: 2/11/2025       To Whom It May Concern:    Adeel Resendiz was seen at [facility] on 2/11/2025 till 2/12/2025.    Please excuse     from work {AMB PED EXCUSE WHEN:20401}.        Sincerely,       No name on file

## 2025-02-11 NOTE — PROGRESS NOTES
Pt seen and examined.  See full office note today for H&P.  Briefly, patient is a 29-year-old -2-2-4 with a miscarriage over the weekend.  She was admitted this weekend with a white count of 23,000 that decreased to 11,000 overnight..  There was an intact fetus noted on pathology.  She presented today to the office complain of increased bleeding and pain.  She is also been having subjective fevers and chills.  Her white count was 14,000.  Her ultrasound shows significant amount of retained products of conception in the endometrium.  She is tender on exam.  We did discuss suction D&C and she is agreeable.  She will be watched overnight and we will continue antibiotics for her UTI.  She denies any allergies.    Retained POC-plan suction D&C. Risks, benefits and alternatives of the procedure were discussed, including , but not limited to: infection, bleeding, transfusion, injury to adjacent structures, laparotomy, possible nondiagnostic findings, possible findings of unexpected malignancy, reoperation, recurrent symptoms, thromboembolic events, anaeasthetic complications and death. Pre/intra/postop course was reviewed and all questions answered. Patient was encouraged to call for any additional questions she might have in the future.   + UTI-patient has been started on cefoxitin and doxycycline.  WBC 14,000, lactic acid pending.  Blood cultures pending.  Diana Rodriguez MD

## 2025-02-11 NOTE — ANESTHESIA PREPROCEDURE EVALUATION
Anesthesia Evaluation     Patient summary reviewed and Nursing notes reviewed   no history of anesthetic complications:   NPO Solid Status: > 8 hours  NPO Liquid Status: > 2 hours           Airway   Mallampati: III  TM distance: >3 FB  Neck ROM: full  Possible difficult intubation  Dental    (+) edentulous    Pulmonary    (+) a smoker Current, vape, asthma (once or twice monthly inhaler use),  Cardiovascular   Exercise tolerance: good (4-7 METS)        Neuro/Psych  (+) psychiatric history Anxiety, Depression and PTSD    ROS Comment: Previous jaw surgery 2024  GI/Hepatic/Renal/Endo    (+) obesity, hepatitis (Treated for this) C, liver disease, renal disease (hx of KEILY 2 years ago (doesn't know the cause))-    ROS Comment: Crohn's disease      Musculoskeletal     (+) back pain, chronic pain  Abdominal    Substance History   (+) drug use (MJ daily)     OB/GYN    (+) Pregnant (missed AB)        Other - negative ROS       ROS/Med Hx Other: Water at 1200              Anesthesia Plan    ASA 3 - emergent     MAC     (GA backup)  intravenous induction     Anesthetic plan, risks, benefits, and alternatives have been provided, discussed and informed consent has been obtained with: patient.  Pre-procedure education provided  Use of blood products discussed with patient  Consented to blood products.    Plan discussed with CRNA.    CODE STATUS:

## 2025-02-11 NOTE — Clinical Note
February 12, 2025     Patient: Adeel Resedniz   YOB: 1995   Date of Visit: 2/11/2025       To Whom it May Concern:    Adeel Resendiz was seen in my clinic on 2/11/2025. She {Return to school/sport:39460}.    If you have any questions or concerns, please don't hesitate to call.         Sincerely,          No name on file        CC: No Recipients

## 2025-02-11 NOTE — NURSING NOTE
Pt tearful after surgery due to her SO being gone. Pt requested that this RN calls him. This RN called him to inform him she was back in her room after surgery and is tearful saying she would like him here. He said he is unable to come at this time to see the pt because he is working on his only vehicle. He said he is texting the pt currently and would let her know.

## 2025-02-12 VITALS
OXYGEN SATURATION: 96 % | HEART RATE: 74 BPM | TEMPERATURE: 97.8 F | RESPIRATION RATE: 16 BRPM | DIASTOLIC BLOOD PRESSURE: 68 MMHG | SYSTOLIC BLOOD PRESSURE: 128 MMHG

## 2025-02-12 LAB
BASOPHILS # BLD AUTO: 0.02 10*3/MM3 (ref 0–0.2)
BASOPHILS NFR BLD AUTO: 0.2 % (ref 0–1.5)
C TRACH RRNA SPEC QL NAA+PROBE: NEGATIVE
DEPRECATED RDW RBC AUTO: 44.9 FL (ref 37–54)
EOSINOPHIL # BLD AUTO: 0 10*3/MM3 (ref 0–0.4)
EOSINOPHIL NFR BLD AUTO: 0 % (ref 0.3–6.2)
ERYTHROCYTE [DISTWIDTH] IN BLOOD BY AUTOMATED COUNT: 12.5 % (ref 12.3–15.4)
HCT VFR BLD AUTO: 31.1 % (ref 34–46.6)
HGB BLD-MCNC: 10.3 G/DL (ref 12–15.9)
IMM GRANULOCYTES # BLD AUTO: 0.19 10*3/MM3 (ref 0–0.05)
IMM GRANULOCYTES NFR BLD AUTO: 1.4 % (ref 0–0.5)
LYMPHOCYTES # BLD AUTO: 0.92 10*3/MM3 (ref 0.7–3.1)
LYMPHOCYTES NFR BLD AUTO: 7 % (ref 19.6–45.3)
MCH RBC QN AUTO: 32.3 PG (ref 26.6–33)
MCHC RBC AUTO-ENTMCNC: 33.1 G/DL (ref 31.5–35.7)
MCV RBC AUTO: 97.5 FL (ref 79–97)
MONOCYTES # BLD AUTO: 0.76 10*3/MM3 (ref 0.1–0.9)
MONOCYTES NFR BLD AUTO: 5.8 % (ref 5–12)
N GONORRHOEA RRNA SPEC QL NAA+PROBE: NEGATIVE
NEUTROPHILS NFR BLD AUTO: 11.26 10*3/MM3 (ref 1.7–7)
NEUTROPHILS NFR BLD AUTO: 85.6 % (ref 42.7–76)
NRBC BLD AUTO-RTO: 0 /100 WBC (ref 0–0.2)
PLATELET # BLD AUTO: 247 10*3/MM3 (ref 140–450)
PMV BLD AUTO: 10.8 FL (ref 6–12)
RBC # BLD AUTO: 3.19 10*6/MM3 (ref 3.77–5.28)
WBC NRBC COR # BLD AUTO: 13.15 10*3/MM3 (ref 3.4–10.8)

## 2025-02-12 PROCEDURE — G0378 HOSPITAL OBSERVATION PER HR: HCPCS

## 2025-02-12 PROCEDURE — 85025 COMPLETE CBC W/AUTO DIFF WBC: CPT | Performed by: OBSTETRICS & GYNECOLOGY

## 2025-02-12 PROCEDURE — 25010000002 CEFOXITIN PER 1 G: Performed by: OBSTETRICS & GYNECOLOGY

## 2025-02-12 RX ORDER — MISOPROSTOL 100 UG/1
TABLET ORAL AS NEEDED
Status: DISCONTINUED | OUTPATIENT
Start: 2025-02-11 | End: 2025-02-12 | Stop reason: HOSPADM

## 2025-02-12 RX ORDER — IBUPROFEN 600 MG/1
600 TABLET, FILM COATED ORAL EVERY 6 HOURS PRN
Qty: 30 TABLET | Refills: 0 | Status: SHIPPED | OUTPATIENT
Start: 2025-02-12

## 2025-02-12 RX ORDER — ACETAMINOPHEN 325 MG/1
650 TABLET ORAL EVERY 4 HOURS PRN
Qty: 30 TABLET | Refills: 2 | Status: SHIPPED | OUTPATIENT
Start: 2025-02-12 | End: 2026-02-12

## 2025-02-12 RX ADMIN — GUAIFENESIN 600 MG: 600 TABLET, EXTENDED RELEASE ORAL at 08:32

## 2025-02-12 RX ADMIN — FLUOXETINE HYDROCHLORIDE 20 MG: 20 CAPSULE ORAL at 08:32

## 2025-02-12 RX ADMIN — GABAPENTIN 300 MG: 300 CAPSULE ORAL at 08:32

## 2025-02-12 RX ADMIN — CEFOXITIN 2000 MG: 2 INJECTION, POWDER, FOR SOLUTION INTRAVENOUS at 01:42

## 2025-02-12 RX ADMIN — CEFOXITIN 2000 MG: 2 INJECTION, POWDER, FOR SOLUTION INTRAVENOUS at 07:46

## 2025-02-12 RX ADMIN — DOXYCYCLINE 100 MG: 100 INJECTION, POWDER, LYOPHILIZED, FOR SOLUTION INTRAVENOUS at 03:56

## 2025-02-12 RX ADMIN — IBUPROFEN 600 MG: 600 TABLET, FILM COATED ORAL at 07:46

## 2025-02-12 NOTE — PLAN OF CARE
Goal Outcome Evaluation:      Pt adequate for discharge  Problem: Adult Inpatient Plan of Care  Goal: Plan of Care Review  Outcome: Met  Goal: Patient-Specific Goal (Individualized)  Outcome: Met  Goal: Absence of Hospital-Acquired Illness or Injury  Outcome: Met  Intervention: Identify and Manage Fall Risk  Recent Flowsheet Documentation  Taken 2025 by Martine Roldan RN  Safety Promotion/Fall Prevention: safety round/check completed  Goal: Optimal Comfort and Wellbeing  Outcome: Met  Intervention: Monitor Pain and Promote Comfort  Recent Flowsheet Documentation  Taken 2025 by Martine Roldan RN  Pain Management Interventions: pain medication given  Intervention: Provide Person-Centered Care  Recent Flowsheet Documentation  Taken 2025 by Martine Roldan RN  Trust Relationship/Rapport:   care explained   choices provided   emotional support provided   empathic listening provided   questions answered   questions encouraged   reassurance provided   thoughts/feelings acknowledged  Goal: Readiness for Transition of Care  Outcome: Met  Intervention: Mutually Develop Transition Plan  Recent Flowsheet Documentation  Taken 202545 by Martine Roldan RN  Equipment Currently Used at Home: none  Transportation Anticipated: family or friend will provide  Patient/Family Anticipated Services at Transition: none  Patient/Family Anticipates Transition to: home with family     Problem:  Loss  Goal: Optimal Adjustment to Loss  Outcome: Met     Problem: Infection Progression (Sepsis)  Goal: Absence of Infection Signs and Symptoms  Outcome: Met

## 2025-02-12 NOTE — ADDENDUM NOTE
Addendum  created 02/12/25 130 by Kalpana Pa CRNA    Clinical Note Signed, Intraprocedure Blocks edited, LDA updated via procedure documentation

## 2025-02-12 NOTE — DISCHARGE SUMMARY
Date of Admission: 2025 12:24 PM      Date of Discharge:  2025    Admitting Service: [facility]    Admission Diagnosis: Retained products of conception and UTI   Discharge Diagnosis: Retained products of conception and UTI     Presenting Problem/History of Present Illness  Retained products of conception after miscarriage [O03.4]       Hospital Course  Patient is a 29 y.o. female  was seen in the office yesterday to follow up of her miscarriage. She was seen over the weekend in the ER for a miscarriage. She was ultimately admitted over night d/t an increased WBC of 23K. She did not have an US completed at this ER visit. The tissue she passed during her ER visit was sent to pathology showing products of conception with a grossly identifiable intact fetus and microscopically immature fetal cells. She reports prior to her ER visit, she started having bleeding on Thursday night. She presented to the ER with c/o bleeding and pain. Her EDC was established prior of 25 making her approx 10+weeks at this time. Ultimately she was discharged home after being observed overnight. She was afebrile and her WBC improved to 11K. She was examined by Dr. Stevens and rec she follow up in the office on Monday. She was seen in the office yesterday with c/o feeling terrible. She c/o sweating and having chills. She has not checked her temp. She c/o pain no relieved by ibuprofen, flexeril or gabapentin. She reports pain in her lower pelvis and pain in her back. She is very tearful today. She is accompanied by her partner. She continues to have some bleeding as well.  Her US revealed possible retained POCs. She also has a UTI and has not started her antibiotics yet. She was admitted to  for IV antibiotics, labs, and D&C for US findings.  Upon admission her WBC was 14K. She rec'd a couple doses of cefoxitin and doxycycline. She has been afebrile. Her VS stable. She underwent a suction D&C with Dr. Rodriguez. She continues to  have some pain but does have an UTI. She is emotional regarding her pregnancy loss. A workup for recurrent pregnancy loss will be completed in the near future for her. Her discharge instructions, warn s/s, recovery expectations, and return office appointment. She has a prescription for Macrobid, advised patient to make sure she completes this antibiotic as ordered.     Procedures Performed  Procedure(s):  DILATATION AND CURETTAGE WITH SUCTION       Consults:   Consults       No orders found for last 30 day(s).            Pertinent Test Results: labs: cbc    Condition on Discharge:  Satisfactory    Vital Signs  Temp:  [96.7 °F (35.9 °C)-101.8 °F (38.8 °C)] 97.8 °F (36.6 °C)  Heart Rate:  [] 74  Resp:  [14-24] 16  BP: ()/(45-98) 128/68    Physical Exam:   General Appearance: alert, pleasant, appears stated age, and cooperative  Abdomen: normal bowel sounds and soft non-tender  Extremities: moves extremities well, no edema, no tenderness, and Kelley's sign negative  Skin: no bleeding, bruising or rash  Psych: normal    Discharge Disposition  Home or Self Care    Discharge Medications     Discharge Medications        New Medications        Instructions Start Date   acetaminophen 325 MG tablet  Commonly known as: Tylenol   650 mg, Oral, Every 4 Hours PRN      Effexor XR 37.5 MG 24 hr capsule  Generic drug: venlafaxine XR   Oral      gabapentin 300 MG capsule  Commonly known as: NEURONTIN   1 capsule, Oral, 3 times daily      hydrOXYzine 25 MG tablet  Commonly known as: ATARAX       Vraylar 3 MG capsule capsule  Generic drug: Cariprazine HCl   1 capsule, Oral, Daily             Continue These Medications        Instructions Start Date   amphetamine-dextroamphetamine 20 MG tablet  Commonly known as: ADDERALL   TAKE 1 TABLET BY MOUTH TWICE DAILY FOR ADHD SYMPTOMS      aspirin 81 MG EC tablet   81 mg, Oral, Daily      busPIRone 10 MG tablet  Commonly known as: BUSPAR   1 tablet, Every 12 Hours Scheduled       chlorhexidine 0.12 % solution  Commonly known as: PERIDEX       cyclobenzaprine 10 MG tablet  Commonly known as: FLEXERIL   10 mg      fluorometholone 0.1 % ophthalmic suspension  Commonly known as: FML   INSTILL 1 DROP INTO RIGHT EYE 4 TIMES DAILY AS DIRECTED FOR 7 DAYS      FLUoxetine 20 MG capsule  Commonly known as: PROzac   1 capsule, Daily      FLUoxetine 40 MG capsule  Commonly known as: PROzac   40 mg, Daily      folic acid 1 MG tablet  Commonly known as: FOLVITE   1 mg, Oral, Daily      ibuprofen 600 MG tablet  Commonly known as: ADVIL,MOTRIN   600 mg, Oral, Every 6 Hours PRN      melatonin 5 MG tablet tablet       nitrofurantoin (macrocrystal-monohydrate) 100 MG capsule  Commonly known as: Macrobid   100 mg, Oral, 2 Times Daily      ondansetron 4 MG tablet  Commonly known as: ZOFRAN       ondansetron ODT 4 MG disintegrating tablet  Commonly known as: ZOFRAN-ODT   4 mg, Translingual, Every 8 Hours PRN      Prenatal 28-0.8 MG tablet   1 tablet, Oral, Daily      Ventolin  (90 Base) MCG/ACT inhaler  Generic drug: albuterol sulfate HFA       albuterol sulfate  (90 Base) MCG/ACT inhaler  Commonly known as: PROVENTIL HFA;VENTOLIN HFA;PROAIR HFA   2 puffs, Inhalation, Every 4 Hours PRN               Discharge Diet:   Diet Instructions       Diet: Regular/House Diet; Regular (IDDSI 7); Thin (IDDSI 0)      Discharge Diet: Regular/House Diet    Texture: Regular (IDDSI 7)    Fluid Consistency: Thin (IDDSI 0)            Activity at Discharge:   Activity Instructions       Activity as Tolerated      Pelvic Rest              Follow-up Appointments  Future Appointments   Date Time Provider Department Center   3/5/2025  2:00 PM Malathi Johnston APRN [facility]     Additional Instructions for the Follow-ups that You Need to Schedule       Discharge Follow-up with Specialty: [facility]; 2 Days   As directed      Specialty: [facility]   Follow Up: 2 Days   Follow Up Details: follow up                Test Results  Pending at Discharge  Pending Labs       Order Current Status    Blood Culture - Blood, Arm, Right In process    Tissue Pathology Exam In process    Urine Culture - Urine, Straight Cath In process             QUINTIN Ahn  02/12/25  08:53 EST    Time: Discharge 25 min

## 2025-02-12 NOTE — CASE MANAGEMENT/SOCIAL WORK
Case Management Discharge Note      Final Note: dc home         Selected Continued Care - Discharged on 2/12/2025 Admission date: 2/11/2025 - Discharge disposition: Home or Self Care      Destination    No services have been selected for the patient.                Durable Medical Equipment    No services have been selected for the patient.                Dialysis/Infusion    No services have been selected for the patient.                Home Medical Care    No services have been selected for the patient.                Therapy    No services have been selected for the patient.                Community Resources    No services have been selected for the patient.                Community & DME    No services have been selected for the patient.                         Final Discharge Disposition Code: 01 - home or self-care

## 2025-02-12 NOTE — PLAN OF CARE
Goal Outcome Evaluation:  Plan of Care Reviewed With: patient        Progress: improving  Outcome Evaluation: vss, voiding well, no vaginal bleeding noted, up ad meir, pain well controlled with prn meds, iv antibiotics

## 2025-02-13 LAB
BACTERIA SPEC AEROBE CULT: ABNORMAL
CYTO UR: NORMAL
LAB AP CASE REPORT: NORMAL
PATH REPORT.FINAL DX SPEC: NORMAL
PATH REPORT.GROSS SPEC: NORMAL

## 2025-02-13 NOTE — PAYOR COMM NOTE
"Adeel Mcgowan (29 y.o. Female)    OBS   Date of Birth   1995    Social Security Number       Address   [address] [place] [address]    Home Phone   [phone]    MRN   [id number]       Catholic   None    Marital Status   Single                            Admission Date   2/11/25    Admission Type   Elective    Admitting Provider   Ernie Martínez MD    Attending Provider       Department, Room/Bed   [facility] OB GYN, 1122/1       Discharge Date   2/12/2025    Discharge Disposition   Home or Self Care    Discharge Destination                                 Attending Provider: (none)   Allergies: Cat Dander    Isolation: None   Infection: None   Code Status: Prior    Ht: 165.1 cm (65\")   Wt: 112 kg (246 lb)    Admission Cmt: None   Principal Problem: Retained products of conception after miscarriage [O03.4]                   Active Insurance as of 2/11/2025       Primary Coverage       Payor Plan Insurance Group Employer/Plan Group    [employer] [place] [employer]ICA[place]        Payor Plan Address Payor Plan Phone Number Payor Plan Fax Number Effective Dates     [address] [address] [phone]  10/19/2023 - None Entered    [place] [address]         Subscriber Name Subscriber Birth Date Member ID       ADEEL MCGOWAN 1995 [id number]                     Emergency Contacts        (Rel.) Home Phone Work Phone Mobile Phone    DAI العراقي (Significant Other) [phone] -- [phone]              Insurance Information                  [facility]/[employer] MEDICAID Phone: [phone]    Subscriber: Adeel Mcgowan Subscriber#: [id number]    Group#: -- Precert#: --    Authorization#: -- Effective Date: --          Problem List             Codes Noted - Resolved       Hospital    * (Principal) Retained products of conception after miscarriage ICD-10-CM: O03.4  ICD-9-CM: 634.90 2/11/2025 - Present       Non-Hospital    Recurrent pregnancy loss " ICD-10-CM: N96  ICD-9-CM: [id number] 2025 - Present    Incomplete miscarriage ICD-10-CM: O03.4  ICD-9-CM: 634.91 2025 - Present    Urinary tract infection in mother during first trimester of pregnancy ICD-10-CM: O23.41  ICD-9-CM: 646.63, 599.0 2025 - Present    Miscarriage ICD-10-CM: O03.9  ICD-9-CM: 634.90 2025 - Present    Genital herpes ICD-10-CM: A60.00  ICD-9-CM: 054.10 2025 - Present    History of  delivery, currently pregnant ICD-10-CM: O34.219  ICD-9-CM: 654.20 2025 - Present    History of twin pregnancy in prior pregnancy ICD-10-CM: Z87.59  ICD-9-CM: V13.29 2025 - Present    History of gestational hypertension ICD-10-CM: Z87.59  ICD-9-CM: V13.29 2025 - Present    History of intrauterine fetal death in previous pregnancy- disappearing twin @ 15wga ICD-10-CM: Z87.59  ICD-9-CM: V13.29 2025 - Present    Short interval between pregnancies affecting pregnancy, antepartum- C/S 2024 ICD-10-CM: O09.899  ICD-9-CM: V23.89 2025 - Present    Crohn's disease ICD-10-CM: K50.90  ICD-9-CM: 555.9 Unknown - Present    PTSD (post-traumatic stress disorder) ICD-10-CM: F43.10  ICD-9-CM: 309.81 Unknown - Present    History of hepatitis C ICD-10-CM: Z86.19  ICD-9-CM: V12.09 Unknown - Present    Bipolar affective disorder ICD-10-CM: F31.9  ICD-9-CM: 296.80 Unknown - Present    Anxiety and depression ICD-10-CM: F41.9, F32.A  ICD-9-CM: 300.00, 311 Unknown - Present    ADHD ICD-10-CM: F90.9  ICD-9-CM: 314.01 Unknown - Present    History of drug use- meth, THC ICD-10-CM: F19.91  ICD-9-CM: 305.93 2025 - Present    History of alcohol abuse- last used 2 months ago ICD-10-CM: F10.11  ICD-9-CM: 305.03 2025 - Present    Obesity affecting pregnancy, antepartum ICD-10-CM: O99.210  ICD-9-CM: 649.13 2025 - Present     History & Physical    No notes of this type exist for this encounter.       Facility-Administered Medications as of 2025   Medication Dose Route Frequency  Provider Last Rate Last Admin    [COMPLETED] acetaminophen (TYLENOL) tablet 650 mg  650 mg Oral Once Kalpana Pa, CRNA   650 mg at 25 1616    [COMPLETED] dexAMETHasone (DECADRON) injection 8 mg  8 mg Intravenous Once PRN Kalpana Pa, CRNA   8 mg at 25 1453    [COMPLETED] famotidine (PEPCID) injection 20 mg  20 mg Intravenous 60 Min Pre-Op Ember Kalpana, CRNA   20 mg at 25 1454    [COMPLETED] HYDROmorphone (DILAUDID) injection 1 mg  1 mg Intravenous Once Diana Rodriguez MD   1 mg at 25 1334    [] lactated ringers infusion  125 mL/hr Intravenous Continuous Malathi Johnston APRN 125 mL/hr at 25 1453 125 mL/hr at 25 1453    [COMPLETED] Midazolam HCl (PF) (VERSED) injection 2 mg  2 mg Intravenous Once Kalpana Pa, CRNA   2 mg at 25 1455    [COMPLETED] ondansetron (ZOFRAN) injection 4 mg  4 mg Intravenous Once PRN Kalpana Pa, CRNA   4 mg at 25 1454    [COMPLETED] oxyCODONE (ROXICODONE) immediate release tablet 5 mg  5 mg Oral Once PRN Kalpana Pa, CRNA   5 mg at 25 1625     Lab Results (last 72 hours)       Procedure Component Value Units Date/Time    CBC & Differential [[id number]]  (Abnormal) Collected: 25    Specimen: Blood from Arm, Left Updated: 25    Narrative:      The following orders were created for panel order CBC & Differential.  Procedure                               Abnormality         Status                     ---------                               -----------         ------                     CBC Auto Differential[[id number]]        Abnormal            Final result                 Please view results for these tests on the individual orders.    CBC Auto Differential [[id number]]  (Abnormal) Collected: 25    Specimen: Blood from Arm, Left Updated: 25     WBC 13.15 10*3/mm3      RBC 3.19 10*6/mm3      Hemoglobin 10.3 g/dL      Hematocrit 31.1 %      MCV  97.5 fL      MCH 32.3 pg      MCHC 33.1 g/dL      RDW 12.5 %      RDW-SD 44.9 fl      MPV 10.8 fL      Platelets 247 10*3/mm3      Neutrophil % 85.6 %      Lymphocyte % 7.0 %      Monocyte % 5.8 %      Eosinophil % 0.0 %      Basophil % 0.2 %      Immature Grans % 1.4 %      Neutrophils, Absolute 11.26 10*3/mm3      Lymphocytes, Absolute 0.92 10*3/mm3      Monocytes, Absolute 0.76 10*3/mm3      Eosinophils, Absolute 0.00 10*3/mm3      Basophils, Absolute 0.02 10*3/mm3      Immature Grans, Absolute 0.19 10*3/mm3      nRBC 0.0 /100 WBC     Tissue Pathology Exam [[id number]] Collected: 02/11/25 1543    Specimen: Tissue from Products of Conception Updated: 02/11/25 1556    Lactic Acid, Plasma [[id number]]  (Normal) Collected: 02/11/25 1340    Specimen: Blood Updated: 02/11/25 1403     Lactate 1.0 mmol/L     Urinalysis, Microscopic Only - Straight Cath [[id number]]  (Abnormal) Collected: 02/11/25 1258    Specimen: Urine from Straight Cath Updated: 02/11/25 1328     RBC, UA None Seen /HPF      WBC, UA 11-20 /HPF      Bacteria, UA 3+ /HPF      Squamous Epithelial Cells, UA None Seen /HPF      Hyaline Casts, UA None Seen /LPF      Methodology Manual Light Microscopy    Comprehensive Metabolic Panel [[id number]]  (Abnormal) Collected: 02/11/25 1244    Specimen: Blood Updated: 02/11/25 1315     Glucose 111 mg/dL      BUN 8 mg/dL      Creatinine 0.61 mg/dL      Sodium 133 mmol/L      Potassium 3.6 mmol/L      Chloride 96 mmol/L      CO2 23.7 mmol/L      Calcium 9.0 mg/dL      Total Protein 7.5 g/dL      Albumin 3.9 g/dL      ALT (SGPT) 32 U/L      AST (SGOT) 15 U/L      Alkaline Phosphatase 117 U/L      Total Bilirubin 0.7 mg/dL      Globulin 3.6 gm/dL      A/G Ratio 1.1 g/dL      BUN/Creatinine Ratio 13.1     Anion Gap 13.3 mmol/L      eGFR 125.1 mL/min/1.73     Narrative:      GFR Categories in Chronic Kidney Disease (CKD)      GFR Category          GFR (mL/min/1.73)    Interpretation  G1                     90 or greater          Normal or high (1)  G2                      60-89                Mild decrease (1)  G3a                   45-59                Mild to moderate decrease  G3b                   30-44                Moderate to severe decrease  G4                    15-29                Severe decrease  G5                    14 or less           Kidney failure          (1)In the absence of evidence of kidney disease, neither GFR category G1 or G2 fulfill the criteria for CKD.    eGFR calculation 2021 CKD-EPI creatinine equation, which does not include race as a factor    hCG, Quantitative, Pregnancy [[id number]] Collected: 02/11/25 1244    Specimen: Blood Updated: 02/11/25 1311     HCG Quantitative 2,392.00 mIU/mL     Narrative:      HCG Ranges by Gestational Age    Females - non-pregnant premenopausal   </= 1mIU/mL HCG  Females - postmenopausal               </= 7mIU/mL HCG    3 Weeks       5.4   -      72 mIU/mL  4 Weeks      10.2   -     708 mIU/mL  5 Weeks       217   -   8,245 mIU/mL  6 Weeks       152   -  32,177 mIU/mL  7 Weeks     4,059   - 153,767 mIU/mL  8 Weeks    31,366   - 149,094 mIU/mL  9 Weeks    59,109   - 135,901 mIU/mL  10 Weeks   44,186   - 170,409 mIU/mL  12 Weeks   27,107   - 201,615 mIU/mL  14 Weeks   24,302   -  93,646 mIU/mL  15 Weeks   12,540   -  69,747 mIU/mL  16 Weeks    8,904   -  55,332 mIU/mL  17 Weeks    8,240   -  51,793 mIU/mL  18 Weeks    9,649   -  55,271 mIU/mL      Urinalysis With Culture If Indicated - Straight Cath [[id number]]  (Abnormal) Collected: 02/11/25 1258    Specimen: Urine from Straight Cath Updated: 02/11/25 1311     Color, UA Dark Yellow     Appearance, UA Clear     pH, UA 6.0     Specific Gravity, UA 1.020     Glucose, UA Negative     Ketones, UA Negative     Bilirubin, UA Small (1+)     Blood, UA Negative     Protein,  mg/dL (2+)     Leuk Esterase, UA Small (1+)     Nitrite, UA Negative     Urobilinogen, UA >=8.0 E.U./dL    Narrative:      In absence of clinical symptoms, the  presence of pyuria, bacteria, and/or nitrites on the urinalysis result does not correlate with infection.    CBC & Differential [[id number]]  (Abnormal) Collected: 02/11/25 1244    Specimen: Blood Updated: 02/11/25 1255    Narrative:      The following orders were created for panel order CBC & Differential.  Procedure                               Abnormality         Status                     ---------                               -----------         ------                     CBC Auto Differential[[id number]]        Abnormal            Final result                 Please view results for these tests on the individual orders.    CBC Auto Differential [[id number]]  (Abnormal) Collected: 02/11/25 1244    Specimen: Blood Updated: 02/11/25 1255     WBC 14.62 10*3/mm3      RBC 3.51 10*6/mm3      Hemoglobin 11.2 g/dL      Hematocrit 33.9 %      MCV 96.6 fL      MCH 31.9 pg      MCHC 33.0 g/dL      RDW 12.5 %      RDW-SD 44.6 fl      MPV 10.1 fL      Platelets 225 10*3/mm3      Neutrophil % 80.8 %      Lymphocyte % 7.9 %      Monocyte % 10.0 %      Eosinophil % 0.3 %      Basophil % 0.3 %      Immature Grans % 0.7 %      Neutrophils, Absolute 11.82 10*3/mm3      Lymphocytes, Absolute 1.15 10*3/mm3      Monocytes, Absolute 1.46 10*3/mm3      Eosinophils, Absolute 0.04 10*3/mm3      Basophils, Absolute 0.05 10*3/mm3      Immature Grans, Absolute 0.10 10*3/mm3      nRBC 0.0 /100 WBC           Imaging Results (Last 72 Hours)       ** No results found for the last 72 hours. **          ECG/EMG Results (last 72 hours)       ** No results found for the last 72 hours. **          Orders (last 72 hrs)        Start     Ordered    02/12/25 0851  Discontinue IV  Once,   Status:  Canceled         02/12/25 0853    02/12/25 0847  Discharge patient  Once         02/12/25 0853    02/12/25 0600  CBC & Differential  Morning Draw         02/11/25 1232    02/12/25 0600  CBC Auto Differential  PROCEDURE ONCE         02/11/25 2201    02/12/25  0000  acetaminophen (Tylenol) 325 MG tablet  Every 4 Hours PRN         02/12/25 0853    02/12/25 0000  ibuprofen (ADVIL,MOTRIN) 600 MG tablet  Every 6 Hours PRN         02/12/25 0853    02/12/25 0000  Discharge Instructions        Comments: Instructions:   1. Call for temp>101, chills, heavy vaginal bleeding, increasing lower abdominal pain, foul smelling discharge, etc  2. NOTHING IN THE VAGINA 2 weeks    3. Bath or shower are fine.       Call for any concerns     Our office phone number:  [phone]    02/12/25 0853    02/12/25 0000  Discharge Follow-up with Specialty: [facility]; 2 Days         02/12/25 0853    02/12/25 0000  Activity as Tolerated         02/12/25 0853    02/12/25 0000  Pelvic Rest         02/12/25 0853    02/12/25 0000  Diet: Regular/House Diet; Regular (IDDSI 7); Thin (IDDSI 0)         02/12/25 0853    02/11/25 2100  sodium chloride 0.9 % flush 10 mL  Every 12 Hours Scheduled,   Status:  Discontinued         02/11/25 1447    02/11/25 2100  sodium chloride 0.9 % flush 10 mL  Every 12 Hours Scheduled,   Status:  Discontinued         02/11/25 1450    02/11/25 2100  gabapentin (NEURONTIN) capsule 300 mg  3 Times Daily,   Status:  Discontinued         02/11/25 1757 02/11/25 2100  gabapentin (NEURONTIN) capsule 300 mg  3 times daily,   Status:  Discontinued         02/11/25 1931 02/11/25 2100  guaiFENesin (MUCINEX) 12 hr tablet 600 mg  Every 12 Hours Scheduled,   Status:  Discontinued         02/11/25 1931 02/11/25 2030  FLUoxetine (PROzac) capsule 20 mg  Daily,   Status:  Discontinued         02/11/25 1931 02/11/25 1928  albuterol (PROVENTIL) nebulizer solution 0.083% 2.5 mg/3mL  Every 6 Hours PRN,   Status:  Discontinued         02/11/25 1931 02/11/25 1927  melatonin tablet 10 mg  Nightly PRN,   Status:  Discontinued         02/11/25 1931 02/11/25 1927  ibuprofen (ADVIL,MOTRIN) tablet 600 mg  Every 6 Hours PRN,   Status:  Discontinued         02/11/25 1931 02/11/25 1923  Initiate  Observation Status  Once         02/11/25 1923    02/11/25 1650  Diet: Regular/House; Fluid Consistency: Thin (IDDSI 0)  Diet Effective Now,   Status:  Canceled         02/11/25 1650    02/11/25 1645  lactated ringers infusion  Once,   Status:  Discontinued         02/11/25 1547    02/11/25 1645  acetaminophen (TYLENOL) tablet 650 mg  Once         02/11/25 1547    02/11/25 1548  Oxygen Therapy- Nasal Cannula; 2 LPM  Continuous,   Status:  Canceled         02/11/25 1547    02/11/25 1548  Continuous Pulse Oximetry  Continuous,   Status:  Canceled         02/11/25 1547    02/11/25 1548  Vital signs every 5 minutes for 15 minutes, every 15 minutes thereafter.  Once,   Status:  Canceled         02/11/25 1547    02/11/25 1548  Apply warming blanket  Once,   Status:  Canceled         02/11/25 1547    02/11/25 1548  Call Anesthesiologist for additional IV Fluid bolus for Hypotension/Tachycardia  Until Discontinued,   Status:  Canceled         02/11/25 1547    02/11/25 1548  Notify Anesthesia of Any Acute Changes in Patient Condition  Until Discontinued,   Status:  Canceled         02/11/25 1547    02/11/25 1548  Notify Anesthesia for Unrelieved Pain  Until Discontinued,   Status:  Canceled         02/11/25 1547    02/11/25 1548  Once DC criteria to floor met, follow surgeon's orders.  Until Discontinued,   Status:  Canceled         02/11/25 1547    02/11/25 1548  Discharge patient from PACU when discharge criteria is met.  Until Discontinued,   Status:  Canceled         02/11/25 1547    02/11/25 1547  ondansetron (ZOFRAN) injection 4 mg  Once As Needed,   Status:  Discontinued         02/11/25 1547    02/11/25 1547  oxyCODONE (ROXICODONE) immediate release tablet 5 mg  Once As Needed         02/11/25 1547    02/11/25 1545  lactated ringers infusion  Continuous,   Status:  Discontinued         02/11/25 1450    02/11/25 1545  Midazolam HCl (PF) (VERSED) injection 2 mg  Once         02/11/25 1450    02/11/25 1544  Tissue  Pathology Exam  RELEASE UPON ORDERING         02/11/25 1544    02/11/25 1530  miSOPROStol (CYTOTEC) tablet  As Needed,   Status:  Discontinued         02/12/25 1609    02/11/25 1451  Continuous Pulse Oximetry  Continuous,   Status:  Canceled         02/11/25 1450    02/11/25 1451  Insert Peripheral IV  Once,   Status:  Canceled         02/11/25 1450    02/11/25 1451  Saline Lock & Maintain IV Access  Continuous,   Status:  Canceled         02/11/25 1450    02/11/25 1450  Vital Signs - Per Anesthesia Protocol  As Needed,   Status:  Canceled       02/11/25 1450    02/11/25 1450  sodium chloride 0.9 % flush 10 mL  As Needed,   Status:  Discontinued         02/11/25 1450    02/11/25 1450  sodium chloride 0.9 % infusion 40 mL  As Needed,   Status:  Discontinued         02/11/25 1450    02/11/25 1450  lidocaine PF 1% (XYLOCAINE) injection 0.5 mL  Once As Needed,   Status:  Discontinued         02/11/25 1450    02/11/25 1450  ondansetron (ZOFRAN) injection 4 mg  Once As Needed         02/11/25 1450    02/11/25 1450  dexAMETHasone (DECADRON) injection 8 mg  Once As Needed         02/11/25 1450    02/11/25 1450  famotidine (PEPCID) injection 20 mg  60 Minutes Pre-Op         02/11/25 1450    02/11/25 1448  Follow Anesthesia Guidelines / Protocol  Once,   Status:  Canceled         02/11/25 1447    02/11/25 1448  Insert Peripheral IV  Once,   Status:  Canceled         02/11/25 1447    02/11/25 1448  Saline Lock & Maintain IV Access  Continuous,   Status:  Canceled         02/11/25 1447    02/11/25 1448  Place Sequential Compression Device  Once         02/11/25 1447    02/11/25 1448  Maintain Sequential Compression Device  Continuous,   Status:  Canceled         02/11/25 1447    02/11/25 1448  Code Status and Medical Interventions: CPR (Attempt to Resuscitate); Full Support  Continuous,   Status:  Canceled         02/11/25 1447    02/11/25 1447  Verify / Perform Chlorhexidine Skin Prep  As Needed,   Status:  Canceled       Comments: Chlorhexidine Skin wipes and instructions for all patients having a procedure requiring an outward incision if not allergic.  If allergic, give antibacterial skin wipes and instructions.  Do not use for facial cases or on any mucus membranes.    02/11/25 1447    02/11/25 1447  sodium chloride 0.9 % flush 10 mL  As Needed,   Status:  Discontinued         02/11/25 1447    02/11/25 1447  sodium chloride 0.9 % infusion 40 mL  As Needed,   Status:  Discontinued         02/11/25 1447    02/11/25 1415  HYDROmorphone (DILAUDID) injection 1 mg  Once         02/11/25 1328    02/11/25 1330  sodium chloride 0.9 % flush 10 mL  Every 12 Hours Scheduled,   Status:  Discontinued         02/11/25 1232    02/11/25 1330  lactated ringers infusion  Continuous         02/11/25 1232    02/11/25 1330  cefOXItin (MEFOXIN) 2,000 mg in sodium chloride 0.9 % 100 mL IVPB-VTB  Every 6 Hours,   Status:  Discontinued         02/11/25 1232    02/11/25 1330  doxycycline (VIBRAMYCIN) 100 mg in sodium chloride 0.9 % 100 mL IVPB-VTB  Every 12 Hours,   Status:  Discontinued         02/11/25 1232    02/11/25 1329  Urine Culture - Urine, Straight Cath  Once         02/11/25 1328    02/11/25 1325  Lactic Acid, Plasma  STAT         02/11/25 1325    02/11/25 1310  Urinalysis, Microscopic Only - Straight Cath  Once         02/11/25 1309    02/11/25 1247  Type & Screen  STAT         02/11/25 1246    02/11/25 1231  Blood Culture - Blood, Arm, Right  STAT         02/11/25 1232    02/11/25 1231  NPO Diet NPO Type: Strict NPO  Diet Effective Now,   Status:  Canceled         02/11/25 1232    02/11/25 1231  Insert Peripheral IV  Once,   Status:  Canceled         02/11/25 1232    02/11/25 1231  PERIPHERAL IV CARE - Connectors / Hubs Must Be Scrubbed 15 Seconds Using 70% Alcohol & Allowed to Dry Before Accessing Line  Continuous,   Status:  Canceled         02/11/25 1232    02/11/25 1230  sodium chloride 0.9 % flush 10 mL  As Needed,   Status:   Discontinued         02/11/25 1232    02/11/25 1230  Change Peripheral IV Site  As Needed,   Status:  Canceled      Comments: Frequency Per Facility Policy    02/11/25 1232    02/11/25 1230  PERIPHERAL IV CARE - Change Dressing As Needed When Damp, Loose or Soiled  As Needed,   Status:  Canceled       02/11/25 1232    02/11/25 1230  Urinalysis With Culture If Indicated - Straight Cath  STAT         02/11/25 1232    02/11/25 1229  hCG, Quantitative, Pregnancy  Once         02/11/25 1232    02/11/25 1229  Comprehensive Metabolic Panel  STAT         02/11/25 1232    02/11/25 1229  CBC & Differential  STAT         02/11/25 1232    02/11/25 1229  CBC Auto Differential  PROCEDURE ONCE         02/11/25 1232                     Operative/Procedure Notes (last 72 hours)        Diana Rodriguez MD at 02/11/25 1529          Date of Service:  2/11/2025    Time of Service:  15:29 EST     Surgical Staff: Surgeons and Role:     * Diana Rodriguez MD - Primary   Additional Staff: Dr. Robinson Martínez   Pre-operative diagnosis(es): Retained POC     Post-operative diagnosis(es): Retained POC   Procedure(s): Procedure(s):  DILATATION AND CURETTAGE WITH SUCTION     Antibiotics: cefoxitin (Mefoxin) and doxycycline  ordered on call to OR     Anesthesia: Type: Choice  ASA:  III          Operative findings: Retained POC   Specimens removed: A: POC   Fluid Intake: 300 mL   Output: Documented Output  Est. Blood Loss 100 mL  Urine Output 50 mL      No intake/output data recorded.     Blood products used: No   Drains: * No LDAs found *   Implant Information: Nothing was implanted during the procedure   Complications: None apparent   Intraoperative consult(s): none   Condition: stable   Disposition: to PACU and [facility]             After all questions answered the patient was taken to the OR and placed under GETA anesthesia. She was carefully and attentively placed in dorsal lithotomy position. She was prepped  and draped in a sterile fashion. A speculum was placed into the vagina and a single tooth tenaculum was placed at the anterior lip of the cervix. The cervical os was noted to be 1cms dilated. A 7 cm curved curette was placed into the uterus and the suction was turned on. The products of conception were removed from the endometrial canal. A serated curette was then placed into the endometrial cavity and it was curretted out till a gritty sensation was felt throughout indicating to me complete evacuation of tissue.  There was no evidence of uterine perforation or cervical laceration. The procedure was deemed completed and all instruments were removed from the vagina. Cytotec 400 mcg was placed rectally. Excellent hemostasis was noted and pt was in stable condition to PACU.  All sponge, needle & instrument counts were correct x 3. The [facility].             Diana Rodriguez MD  25  15:29 EST        Electronically signed by Diana Rodriguez MD at 25 1531          Physician Progress Notes (last 72 hours)        Diana Rodriguez MD at 25 1334          Pt seen and examined.  See full office note today for H&P.  Briefly, patient is a 29-year-old -2-2-4 with a miscarriage over the weekend.  She was admitted this weekend with a white count of 23,000 that decreased to 11,000 overnight..  There was an intact fetus noted on pathology.  She presented today to the office complain of increased bleeding and pain.  She is also been having subjective fevers and chills.  Her white count was 14,000.  Her ultrasound shows significant amount of retained products of conception in the endometrium.  She is tender on exam.  We did discuss suction D&C and she is agreeable.  She will be watched overnight and we will continue antibiotics for her UTI.  She denies any allergies.    Retained POC-plan suction D&C. Risks, benefits and  alternatives of the procedure were discussed, including , but not limited to: infection, bleeding, transfusion, injury to adjacent structures, laparotomy, possible nondiagnostic findings, possible findings of unexpected malignancy, reoperation, recurrent symptoms, thromboembolic events, anaeasthetic complications and death. Pre/intra/postop course was reviewed and all questions answered. Patient was encouraged to call for any additional questions she might have in the future.   + UTI-patient has been started on cefoxitin and doxycycline.  WBC 14,000, lactic acid pending.  Blood cultures pending.  Diana Rodriguez MD          Electronically signed by Diana Rodriguez MD at 02/11/25 4409       Consult Notes (last 72 hours)  Notes from 02/10/25 0919 through 02/13/25 0919   No notes of this type exist for this encounter.

## 2025-02-16 LAB — BACTERIA SPEC AEROBE CULT: NORMAL

## 2025-02-17 LAB
ABO GROUP BLD: NORMAL
ALBUMIN SERPL-MCNC: 3.9 G/DL (ref 4–5)
ALP SERPL-CCNC: 94 IU/L (ref 44–121)
ALT SERPL-CCNC: 43 IU/L (ref 0–32)
AST SERPL-CCNC: 22 IU/L (ref 0–40)
BASOPHILS # BLD AUTO: 0 X10E3/UL (ref 0–0.2)
BASOPHILS NFR BLD AUTO: 0 %
BILIRUB SERPL-MCNC: <0.2 MG/DL (ref 0–1.2)
BLD GP AB SCN SERPL QL: NEGATIVE
BUN SERPL-MCNC: 5 MG/DL (ref 6–20)
BUN/CREAT SERPL: 10 (ref 9–23)
CALCIUM SERPL-MCNC: 8.9 MG/DL (ref 8.7–10.2)
CHLORIDE SERPL-SCNC: 103 MMOL/L (ref 96–106)
CITATION REF LAB TEST: NORMAL
CO2 SERPL-SCNC: 24 MMOL/L (ref 20–29)
CREAT SERPL-MCNC: 0.49 MG/DL (ref 0.57–1)
EGFRCR SERPLBLD CKD-EPI 2021: 132 ML/MIN/1.73
EOSINOPHIL # BLD AUTO: 0.2 X10E3/UL (ref 0–0.4)
EOSINOPHIL NFR BLD AUTO: 3 %
ERYTHROCYTE [DISTWIDTH] IN BLOOD BY AUTOMATED COUNT: 12.4 % (ref 11.7–15.4)
GENDER IDENTITY: NORMAL
GENE DIS ANL CARRIER INTERP-IMP: NORMAL
GENE STUDIED ID: NORMAL
GENETIC SCN SPEC: NORMAL
GLOBULIN SER CALC-MCNC: 2.3 G/DL (ref 1.5–4.5)
GLUCOSE SERPL-MCNC: 82 MG/DL (ref 70–99)
HBA1C MFR BLD: 5.1 % (ref 4.8–5.6)
HBV SURFACE AG SERPL QL IA: NEGATIVE
HCT VFR BLD AUTO: 35.5 % (ref 34–46.6)
HCV GENTYP SERPL NAA+PROBE: 3
HCV GENTYP SERPL NAA+PROBE: NORMAL
HCV IGG SERPL QL IA: REACTIVE
HCV RNA SERPL NAA+PROBE-ACNC: NORMAL IU/ML
HCV RNA SERPL NAA+PROBE-LOG IU: 5.67 LOG10 IU/ML
HGB A MFR BLD ELPH: 97.3 % (ref 96.4–98.8)
HGB A2 MFR BLD ELPH: 2.7 % (ref 1.8–3.2)
HGB BLD-MCNC: 11.6 G/DL (ref 11.1–15.9)
HGB F MFR BLD ELPH: 0 % (ref 0–2)
HGB FRACT BLD-IMP: NORMAL
HGB S MFR BLD ELPH: 0 %
HIV 1+2 AB+HIV1 P24 AG SERPL QL IA: NON REACTIVE
IMM GRANULOCYTES # BLD AUTO: 0 X10E3/UL (ref 0–0.1)
IMM GRANULOCYTES NFR BLD AUTO: 0 %
LAB DIRECTOR NAME PROVIDER: NORMAL
LABORATORY COMMENT REPORT: NORMAL
LYMPHOCYTES # BLD AUTO: 2.3 X10E3/UL (ref 0.7–3.1)
LYMPHOCYTES NFR BLD AUTO: 24 %
Lab: NORMAL
MCH RBC QN AUTO: 32 PG (ref 26.6–33)
MCHC RBC AUTO-ENTMCNC: 32.7 G/DL (ref 31.5–35.7)
MCV RBC AUTO: 98 FL (ref 79–97)
MONOCYTES # BLD AUTO: 0.4 X10E3/UL (ref 0.1–0.9)
MONOCYTES NFR BLD AUTO: 4 %
NEUTROPHILS # BLD AUTO: 6.4 X10E3/UL (ref 1.4–7)
NEUTROPHILS NFR BLD AUTO: 69 %
PLATELET # BLD AUTO: 224 X10E3/UL (ref 150–450)
POTASSIUM SERPL-SCNC: 4.1 MMOL/L (ref 3.5–5.2)
PROT SERPL-MCNC: 6.2 G/DL (ref 6–8.5)
RBC # BLD AUTO: 3.63 X10E6/UL (ref 3.77–5.28)
REASON FOR REFERRAL (NARRATIVE): NORMAL
RECOMMENDATION PATIENT DOC-IMP: NORMAL
REF LAB TEST METHOD: NORMAL
RH BLD: POSITIVE
RPR SER QL: NON REACTIVE
RUBV IGG SERPL IA-ACNC: 2.01 INDEX
SERVICE CMNT-IMP: NORMAL
SODIUM SERPL-SCNC: 138 MMOL/L (ref 134–144)
SPECIMEN SOURCE: NORMAL
VZV IGG SER QL IA: REACTIVE
WBC # BLD AUTO: 9.3 X10E3/UL (ref 3.4–10.8)

## 2025-05-12 ENCOUNTER — TELEPHONE (OUTPATIENT)
Dept: OBSTETRICS AND GYNECOLOGY | Facility: CLINIC | Age: 30
End: 2025-05-12

## 2025-05-12 RX ORDER — DOXYLAMINE SUCCINATE 25 MG/1
25 TABLET ORAL NIGHTLY
Qty: 30 TABLET | Refills: 1 | Status: SHIPPED | OUTPATIENT
Start: 2025-05-12

## 2025-05-12 RX ORDER — DIPHENHYDRAMINE HYDROCHLORIDE 25 MG/1
25 CAPSULE ORAL NIGHTLY
Qty: 30 TABLET | Refills: 1 | Status: SHIPPED | OUTPATIENT
Start: 2025-05-12

## 2025-05-12 RX ORDER — PNV NO.95/FERROUS FUM/FOLIC AC 28MG-0.8MG
1 TABLET ORAL DAILY
Qty: 90 TABLET | Refills: 3 | Status: SHIPPED | OUTPATIENT
Start: 2025-05-12

## 2025-05-12 NOTE — TELEPHONE ENCOUNTER
Caller: Adeel Resendiz    Relationship to patient: Self    Best call back number: [phone] (home)     Patient is needing: A CALL BACK FROM NURSE OR PROVIDER. SEEN AT [facility] ED YESTERDAY. NEW OB, LMP 4/11/25. RECENT MISCARRIAGE. PT THOUGHT THAT SHE WAS SUPPOSED TO COME IN FOR TESTING PRIOR TO GETTING PREGNANT AGAIN, BUT DIDN'T KEEP THE APPT.

## 2025-05-12 NOTE — TELEPHONE ENCOUNTER
Pt called stating she is currently about 4 weeks pregnant. I scheduled her for a confirmation visit 6/11, but she is wanting to know if she could get something called in for her nausea, and would like prenatal vitamins. Please advise.

## 2025-05-30 ENCOUNTER — TELEPHONE (OUTPATIENT)
Dept: OBSTETRICS AND GYNECOLOGY | Facility: CLINIC | Age: 30
End: 2025-05-30

## 2025-05-30 NOTE — TELEPHONE ENCOUNTER
PROVIDER:  QUINTIN GAMEZ    Patient: PARK MCGOWAN     Phone#: [phone] SPOUSE # PATIENT IS IN TREATMENT    REASON FOR THE CALL: PATIENT WAS CALLING TO SEE IF SHE CAN GET SEEN SOONER BECAUSE SHE HAD TO GO TO THE ER AND THEY WANTED TO SEE IF SHE CAN GET SEEN SOONER//PLEASE FOLLOW//HUB WASN'T ABLE TO REACH THE OFFICE     Yes

## 2025-06-18 ENCOUNTER — OFFICE VISIT (OUTPATIENT)
Dept: OBSTETRICS AND GYNECOLOGY | Facility: CLINIC | Age: 30
End: 2025-06-18
Payer: COMMERCIAL

## 2025-06-18 VITALS
HEIGHT: 65 IN | BODY MASS INDEX: 43.65 KG/M2 | DIASTOLIC BLOOD PRESSURE: 80 MMHG | SYSTOLIC BLOOD PRESSURE: 124 MMHG | WEIGHT: 262 LBS

## 2025-06-18 DIAGNOSIS — O09.899 SHORT INTERVAL BETWEEN PREGNANCIES AFFECTING PREGNANCY, ANTEPARTUM: ICD-10-CM

## 2025-06-18 DIAGNOSIS — F19.91 HISTORY OF DRUG USE: ICD-10-CM

## 2025-06-18 DIAGNOSIS — O34.219 HISTORY OF CESAREAN DELIVERY, CURRENTLY PREGNANT: ICD-10-CM

## 2025-06-18 DIAGNOSIS — N96 RECURRENT PREGNANCY LOSS: ICD-10-CM

## 2025-06-18 DIAGNOSIS — O99.210 OBESITY AFFECTING PREGNANCY, ANTEPARTUM, UNSPECIFIED OBESITY TYPE: ICD-10-CM

## 2025-06-18 DIAGNOSIS — O21.9 NAUSEA AND VOMITING DURING PREGNANCY PRIOR TO 22 WEEKS GESTATION: ICD-10-CM

## 2025-06-18 DIAGNOSIS — Z87.59 HISTORY OF TWIN PREGNANCY IN PRIOR PREGNANCY: ICD-10-CM

## 2025-06-18 DIAGNOSIS — Z87.59 HISTORY OF INTRAUTERINE FETAL DEATH IN PREVIOUS PREGNANCY: ICD-10-CM

## 2025-06-18 DIAGNOSIS — N92.6 MISSED MENSES: Primary | ICD-10-CM

## 2025-06-18 DIAGNOSIS — F32.A ANXIETY AND DEPRESSION: ICD-10-CM

## 2025-06-18 DIAGNOSIS — J06.9 ACUTE URI: ICD-10-CM

## 2025-06-18 DIAGNOSIS — Z87.59 HISTORY OF GESTATIONAL HYPERTENSION: ICD-10-CM

## 2025-06-18 DIAGNOSIS — O09.219 HIGH RISK PREGNANCY DUE TO HISTORY OF PRETERM LABOR, ANTEPARTUM: ICD-10-CM

## 2025-06-18 DIAGNOSIS — F41.9 ANXIETY AND DEPRESSION: ICD-10-CM

## 2025-06-18 DIAGNOSIS — F10.11 HISTORY OF ALCOHOL ABUSE: ICD-10-CM

## 2025-06-18 DIAGNOSIS — F31.9 BIPOLAR AFFECTIVE DISORDER, REMISSION STATUS UNSPECIFIED: ICD-10-CM

## 2025-06-18 DIAGNOSIS — Z86.19 HISTORY OF HEPATITIS C: ICD-10-CM

## 2025-06-18 DIAGNOSIS — A60.00 GENITAL HERPES SIMPLEX, UNSPECIFIED SITE: ICD-10-CM

## 2025-06-18 LAB
B-HCG UR QL: POSITIVE
BILIRUB BLD-MCNC: NEGATIVE MG/DL
CLARITY, POC: CLEAR
COLOR UR: YELLOW
EXPIRATION DATE: ABNORMAL
GLUCOSE UR STRIP-MCNC: NEGATIVE MG/DL
INTERNAL NEGATIVE CONTROL: ABNORMAL
INTERNAL POSITIVE CONTROL: ABNORMAL
KETONES UR QL: NEGATIVE
LEUKOCYTE EST, POC: NEGATIVE
Lab: ABNORMAL
NITRITE UR-MCNC: NEGATIVE MG/ML
PH UR: 6 [PH] (ref 5–8)
PROT UR STRIP-MCNC: NEGATIVE MG/DL
RBC # UR STRIP: NEGATIVE /UL
SP GR UR: 1.03 (ref 1–1.03)
UROBILINOGEN UR QL: NORMAL

## 2025-06-18 RX ORDER — ALBUTEROL SULFATE 90 UG/1
2 AEROSOL, METERED RESPIRATORY (INHALATION) EVERY 4 HOURS PRN
Qty: 6.7 G | Refills: 1 | Status: SHIPPED | OUTPATIENT
Start: 2025-06-18

## 2025-06-18 RX ORDER — BUPROPION HYDROCHLORIDE 150 MG/1
150 TABLET, EXTENDED RELEASE ORAL EVERY 12 HOURS
COMMUNITY

## 2025-06-18 RX ORDER — ACAMPROSATE CALCIUM 333 MG/1
333 TABLET, DELAYED RELEASE ORAL 3 TIMES DAILY
COMMUNITY
End: 2025-06-18

## 2025-06-18 RX ORDER — PNV NO.95/FERROUS FUM/FOLIC AC 28MG-0.8MG
1 TABLET ORAL DAILY
Qty: 30 TABLET | Refills: 11 | Status: SHIPPED | OUTPATIENT
Start: 2025-06-18

## 2025-06-18 RX ORDER — PRENATAL VIT/IRON FUM/FOLIC AC 27MG-0.8MG
TABLET ORAL
COMMUNITY
Start: 2025-05-14

## 2025-06-18 RX ORDER — AZITHROMYCIN 250 MG/1
TABLET, FILM COATED ORAL
Qty: 6 TABLET | Refills: 0 | Status: SHIPPED | OUTPATIENT
Start: 2025-06-18

## 2025-06-18 RX ORDER — VORTIOXETINE 10 MG/1
1 TABLET, FILM COATED ORAL DAILY
COMMUNITY
Start: 2025-03-21 | End: 2025-06-18

## 2025-06-18 RX ORDER — OMEPRAZOLE 20 MG/1
20 CAPSULE, DELAYED RELEASE ORAL DAILY
COMMUNITY

## 2025-06-18 RX ORDER — ARIPIPRAZOLE 10 MG/1
10 TABLET ORAL DAILY
COMMUNITY
Start: 2025-04-29

## 2025-06-18 RX ORDER — DIPHENHYDRAMINE HYDROCHLORIDE 25 MG/1
25 CAPSULE ORAL NIGHTLY
Qty: 30 TABLET | Refills: 1 | Status: SHIPPED | OUTPATIENT
Start: 2025-06-18

## 2025-06-18 RX ORDER — DESVENLAFAXINE 50 MG/1
1 TABLET, FILM COATED, EXTENDED RELEASE ORAL DAILY
COMMUNITY
Start: 2025-04-07 | End: 2025-06-18

## 2025-06-18 RX ORDER — ALBUTEROL SULFATE 90 UG/1
2 INHALANT RESPIRATORY (INHALATION) EVERY 6 HOURS PRN
COMMUNITY
Start: 2025-04-29

## 2025-06-18 RX ORDER — GABAPENTIN 600 MG/1
TABLET ORAL
COMMUNITY
Start: 2025-06-10

## 2025-06-18 RX ORDER — PSEUDOEPHED/ACETAMINOPH/DIPHEN 30MG-500MG
TABLET ORAL
COMMUNITY
Start: 2025-05-29

## 2025-06-18 RX ORDER — BUSPIRONE HYDROCHLORIDE 15 MG/1
TABLET ORAL
COMMUNITY
Start: 2025-05-28

## 2025-06-18 RX ORDER — LAMOTRIGINE 150 MG/1
1 TABLET ORAL DAILY
COMMUNITY
Start: 2025-06-10

## 2025-06-18 RX ORDER — CARIPRAZINE 1.5 MG/1
1 CAPSULE, GELATIN COATED ORAL DAILY
COMMUNITY
Start: 2025-03-10 | End: 2025-06-18
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
## 2025-06-19 ENCOUNTER — REFERRAL TRIAGE (OUTPATIENT)
Dept: LABOR AND DELIVERY | Facility: HOSPITAL | Age: 30
End: 2025-06-19
Payer: COMMERCIAL

## 2025-07-03 ENCOUNTER — INITIAL PRENATAL (OUTPATIENT)
Dept: OBSTETRICS AND GYNECOLOGY | Facility: CLINIC | Age: 30
End: 2025-07-03
Payer: COMMERCIAL

## 2025-07-03 VITALS — BODY MASS INDEX: 43.27 KG/M2 | WEIGHT: 260 LBS | SYSTOLIC BLOOD PRESSURE: 98 MMHG | DIASTOLIC BLOOD PRESSURE: 64 MMHG

## 2025-07-03 DIAGNOSIS — O16.9 HYPERTENSION AFFECTING PREGNANCY, ANTEPARTUM: ICD-10-CM

## 2025-07-03 DIAGNOSIS — Z36.9 ENCOUNTER FOR ANTENATAL SCREENING, UNSPECIFIED: ICD-10-CM

## 2025-07-03 DIAGNOSIS — O21.9 NAUSEA AND VOMITING DURING PREGNANCY PRIOR TO 22 WEEKS GESTATION: ICD-10-CM

## 2025-07-03 DIAGNOSIS — Z86.19 HISTORY OF HEPATITIS C: ICD-10-CM

## 2025-07-03 DIAGNOSIS — Z34.91 INITIAL OBSTETRIC VISIT IN FIRST TRIMESTER: Primary | ICD-10-CM

## 2025-07-03 LAB
BILIRUB BLD-MCNC: NEGATIVE MG/DL
CLARITY, POC: CLEAR
COLOR UR: YELLOW
GLUCOSE UR STRIP-MCNC: NEGATIVE MG/DL
KETONES UR QL: NEGATIVE
LEUKOCYTE EST, POC: NEGATIVE
NITRITE UR-MCNC: NEGATIVE MG/ML
PH UR: 5 [PH] (ref 5–8)
PROT UR STRIP-MCNC: NEGATIVE MG/DL
RBC # UR STRIP: NEGATIVE /UL
SP GR UR: 1 (ref 1–1.03)
UROBILINOGEN UR QL: NORMAL

## 2025-07-03 RX ORDER — NICOTINE 21 MG/24HR
1 PATCH, TRANSDERMAL 24 HOURS TRANSDERMAL EVERY 24 HOURS
Qty: 28 PATCH | Refills: 1 | Status: SHIPPED | OUTPATIENT
Start: 2025-07-03

## 2025-07-03 RX ORDER — ASPIRIN 81 MG/1
81 TABLET, CHEWABLE ORAL DAILY
Qty: 90 TABLET | Refills: 3 | Status: SHIPPED | OUTPATIENT
Start: 2025-07-03

## 2025-07-03 RX ORDER — DOXYLAMINE SUCCINATE 25 MG/1
25 TABLET ORAL NIGHTLY
Qty: 30 TABLET | Refills: 1 | Status: SHIPPED | OUTPATIENT
Start: 2025-07-03

## 2025-07-03 RX ORDER — DIPHENHYDRAMINE HYDROCHLORIDE 25 MG/1
CAPSULE ORAL
Qty: 45 TABLET | Refills: 1 | Status: SHIPPED | OUTPATIENT
Start: 2025-07-03

## 2025-07-03 RX ORDER — ONDANSETRON 4 MG/1
4 TABLET, ORALLY DISINTEGRATING ORAL EVERY 8 HOURS PRN
Qty: 30 TABLET | Refills: 2 | Status: SHIPPED | OUTPATIENT
Start: 2025-07-03
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
## 2025-07-07 LAB
ABO GROUP BLD: NORMAL
ALBUMIN SERPL-MCNC: 4.1 G/DL (ref 4–5)
ALP SERPL-CCNC: 99 IU/L (ref 44–121)
ALT SERPL-CCNC: 59 IU/L (ref 0–32)
AMPHETAMINES UR QL SCN: NEGATIVE NG/ML
AST SERPL-CCNC: 44 IU/L (ref 0–40)
BACTERIA UR CULT: NO GROWTH
BACTERIA UR CULT: NORMAL
BARBITURATES UR QL SCN: NEGATIVE NG/ML
BASOPHILS # BLD AUTO: 0 X10E3/UL (ref 0–0.2)
BASOPHILS NFR BLD AUTO: 0 %
BENZODIAZ UR QL SCN: NEGATIVE NG/ML
BILIRUB SERPL-MCNC: 0.3 MG/DL (ref 0–1.2)
BLD GP AB SCN SERPL QL: NEGATIVE
BUN SERPL-MCNC: 6 MG/DL (ref 6–20)
BUN/CREAT SERPL: 11 (ref 9–23)
BUPRENORPHINE UR QL: NEGATIVE NG/ML
BZE UR QL SCN: NEGATIVE NG/ML
C TRACH RRNA SPEC QL NAA+PROBE: NEGATIVE
CALCIUM SERPL-MCNC: 9.4 MG/DL (ref 8.7–10.2)
CANNABINOIDS UR QL SCN: POSITIVE NG/ML
CHLORIDE SERPL-SCNC: 100 MMOL/L (ref 96–106)
CO2 SERPL-SCNC: 21 MMOL/L (ref 20–29)
CREAT SERPL-MCNC: 0.57 MG/DL (ref 0.57–1)
CREAT UR-MCNC: 101.2 MG/DL (ref 20–300)
CREAT UR-MCNC: 94 MG/DL
EGFRCR SERPLBLD CKD-EPI 2021: 127 ML/MIN/1.73
EOSINOPHIL # BLD AUTO: 0.1 X10E3/UL (ref 0–0.4)
EOSINOPHIL NFR BLD AUTO: 1 %
ERYTHROCYTE [DISTWIDTH] IN BLOOD BY AUTOMATED COUNT: 13.9 % (ref 11.7–15.4)
FENTANYL UR-MCNC: NEGATIVE PG/ML
GLOBULIN SER CALC-MCNC: 2.4 G/DL (ref 1.5–4.5)
GLUCOSE SERPL-MCNC: 79 MG/DL (ref 70–99)
HBA1C MFR BLD: 4.9 % (ref 4.8–5.6)
HBV SURFACE AG SERPL QL IA: NEGATIVE
HCT VFR BLD AUTO: 37.9 % (ref 34–46.6)
HCV GENTYP SERPL NAA+PROBE: 3
HCV GENTYP SERPL NAA+PROBE: NORMAL
HCV IGG SERPL QL IA: REACTIVE
HCV RNA SERPL NAA+PROBE-ACNC: NORMAL IU/ML
HCV RNA SERPL NAA+PROBE-LOG IU: 5.05 LOG10 IU/ML
HGB A MFR BLD ELPH: 97.2 % (ref 96.4–98.8)
HGB A2 MFR BLD ELPH: 2.8 % (ref 1.8–3.2)
HGB BLD-MCNC: 12.5 G/DL (ref 11.1–15.9)
HGB F MFR BLD ELPH: 0 % (ref 0–2)
HGB FRACT BLD-IMP: NORMAL
HGB S MFR BLD ELPH: 0 %
HIV 1+2 AB+HIV1 P24 AG SERPL QL IA: NON REACTIVE
IMM GRANULOCYTES # BLD AUTO: 0 X10E3/UL (ref 0–0.1)
IMM GRANULOCYTES NFR BLD AUTO: 0 %
LABORATORY COMMENT REPORT: ABNORMAL
LABORATORY COMMENT REPORT: NORMAL
LYMPHOCYTES # BLD AUTO: 2 X10E3/UL (ref 0.7–3.1)
LYMPHOCYTES NFR BLD AUTO: 21 %
MCH RBC QN AUTO: 31 PG (ref 26.6–33)
MCHC RBC AUTO-ENTMCNC: 33 G/DL (ref 31.5–35.7)
MCV RBC AUTO: 94 FL (ref 79–97)
MEPERIDINE UR QL: NEGATIVE NG/ML
METHADONE UR QL SCN: NEGATIVE NG/ML
MONOCYTES # BLD AUTO: 0.7 X10E3/UL (ref 0.1–0.9)
MONOCYTES NFR BLD AUTO: 7 %
N GONORRHOEA RRNA SPEC QL NAA+PROBE: NEGATIVE
NEUTROPHILS # BLD AUTO: 6.9 X10E3/UL (ref 1.4–7)
NEUTROPHILS NFR BLD AUTO: 71 %
OBSERVATION IMP: NORMAL
OPIATES UR QL SCN: NEGATIVE NG/ML
OXYCODONE+OXYMORPHONE UR QL SCN: NEGATIVE NG/ML
PCP UR QL: NEGATIVE NG/ML
PH UR: 6.1 [PH] (ref 4.5–8.9)
PLATELET # BLD AUTO: 242 X10E3/UL (ref 150–450)
POTASSIUM SERPL-SCNC: 3.9 MMOL/L (ref 3.5–5.2)
PROPOXYPH UR QL SCN: NEGATIVE NG/ML
PROT SERPL-MCNC: 6.5 G/DL (ref 6–8.5)
PROT UR-MCNC: 25.8 MG/DL
PROT/CREAT UR: 274 MG/G CREAT (ref 0–200)
RBC # BLD AUTO: 4.03 X10E6/UL (ref 3.77–5.28)
RH BLD: POSITIVE
RPR SER QL: NON REACTIVE
RUBV IGG SERPL IA-ACNC: 2.14 INDEX
SODIUM SERPL-SCNC: 135 MMOL/L (ref 134–144)
T VAGINALIS RRNA SPEC QL NAA+PROBE: NEGATIVE
TRAMADOL UR QL SCN: NEGATIVE NG/ML
VZV IGG SER QL IA: REACTIVE
WBC # BLD AUTO: 9.8 X10E3/UL (ref 3.4–10.8)

## 2025-07-08 PROBLEM — B17.10 ACUTE HEPATITIS C COMPLICATING PREGNANCY, ANTEPARTUM: Status: ACTIVE | Noted: 2025-07-08

## 2025-07-08 PROBLEM — O98.419 ACUTE HEPATITIS C COMPLICATING PREGNANCY, ANTEPARTUM: Status: ACTIVE | Noted: 2025-07-08

## 2025-07-08 PROBLEM — B17.10 ACUTE HEPATITIS C COMPLICATING PREGNANCY, ANTEPARTUM: Status: RESOLVED | Noted: 2025-07-08 | Resolved: 2025-07-08

## 2025-07-08 PROBLEM — R74.8 ELEVATED LIVER ENZYMES: Status: ACTIVE | Noted: 2025-07-08

## 2025-07-08 PROBLEM — O98.419 ACUTE HEPATITIS C COMPLICATING PREGNANCY, ANTEPARTUM: Status: RESOLVED | Noted: 2025-07-08 | Resolved: 2025-07-08

## 2025-07-09 DIAGNOSIS — Z86.19 HISTORY OF HEPATITIS C: Primary | ICD-10-CM

## 2025-07-09 DIAGNOSIS — R74.8 ELEVATED LIVER ENZYMES: ICD-10-CM

## 2025-07-10 PROBLEM — O16.9 HYPERTENSION AFFECTING PREGNANCY, ANTEPARTUM: Status: ACTIVE | Noted: 2025-07-10

## 2025-07-10 PROBLEM — Z34.91 INITIAL OBSTETRIC VISIT IN FIRST TRIMESTER: Status: ACTIVE | Noted: 2025-07-10

## 2025-07-11 LAB
CFDNA.FET/CFDNA.TOTAL SFR FETUS: NORMAL %
CITATION REF LAB TEST: NORMAL
FET 13+18+21+X+Y ANEUP PLAS.CFDNA: NEGATIVE
FET CHR 21 TS PLAS.CFDNA QL: NEGATIVE
FET SEX PLAS.CFDNA DOSAGE CFDNA: NORMAL
FET TS 13 RISK PLAS.CFDNA QL: NEGATIVE
FET TS 18 RISK WBC.DNA+CFDNA QL: NEGATIVE
GA EST FROM CONCEPTION DATE: NORMAL D
GESTATIONAL AGE > 9:: YES
LAB DIRECTOR NAME PROVIDER: NORMAL
LAB DIRECTOR NAME PROVIDER: NORMAL
LABORATORY COMMENT REPORT: NORMAL
LIMITATIONS OF THE TEST: NORMAL
NEGATIVE PREDICTIVE VALUE: NORMAL
PERFORMANCE CHARACTERISTICS: NORMAL
POSITIVE PREDICTIVE VALUE: NORMAL
REF LAB TEST METHOD: NORMAL
SERVICE CMNT-IMP: NORMAL
TEST PERFORMANCE INFO SPEC: NORMAL

## 2025-07-15 ENCOUNTER — PATIENT OUTREACH (OUTPATIENT)
Dept: LABOR AND DELIVERY | Facility: HOSPITAL | Age: 30
End: 2025-07-15
Payer: COMMERCIAL

## 2025-07-15 NOTE — OUTREACH NOTE
[facility]  Enrollment    Current Estimated Gestational Age: 13w4d    Questions/Answers      Flowsheet Row Responses   Would like to participate? Yes   Date of Intake Visit 07/15/25            Pt requests call later to finish intake, was getting sick.    Manjeet Smith RN  Maternity Nurse Navigator    7/15/2025, 12:13 EDT

## 2025-07-15 NOTE — OUTREACH NOTE
[facility]  Unable to Reach    Questions/Answers      Flowsheet Row Responses   Pending Outreach Intake Visit   Call Attempt First   Outcome Not available            Called pt back, as requested, no vm avail.  Will try again at a later date to complete intake visit.    Manjeet Smith RN  Maternity Nurse Navigator    7/15/2025, 14:40 EDT

## 2025-07-29 ENCOUNTER — PATIENT OUTREACH (OUTPATIENT)
Dept: LABOR AND DELIVERY | Facility: HOSPITAL | Age: 30
End: 2025-07-29
Payer: COMMERCIAL

## 2025-07-29 NOTE — OUTREACH NOTE
[facility]  Intake    Current Estimated Gestational Age: 15w4d    Intake Assessment      Flowsheet Row Responses   Best Method for Contacting Cell   Currently Employed No   Able to keep appointments as scheduled Yes   Gender(s) and Name(s) m   Resources Presently Utilizing: WIC (Women, Infant, Children), Other, Mental Health Services, Case Management, Community Agency   Other Resources Utilizing: SNAP, IOP, CAC, Fed Trans   Maternal Warning Signs Provided   Other Education HANDS, How to find a dentist, Insurance benefits/Incentives, Transportation Assistance, SNAP Benefits, WIC Benefits            Tobacco, Alcohol, and Drug History     reports that she has quit smoking. Her smoking use included cigarettes. She has never used smokeless tobacco.   reports current alcohol use.   reports current drug use. Drug: Marijuana.    Manjeet GARCIA - RN  Maternity Nurse Navigator    7/29/2025, 09:59 EDT    [facility]  Check-In    Current Estimated Gestational Age: 15w4d      Questions/Answers      Flowsheet Row Responses   Best Method for Contacting Cell   Demographics Reviewed Yes   Currently Employed No   Able to keep appointments as scheduled Yes   Gender(s) and Name(s) m   Baby Active/Feeling Fetal Movemen Yes   How are you presently feeling? good   Questions regarding prenatal visits or tests to be ordered? No   May I ask you questions about your substance use? Yes   Other Comment see hx of etoh, involved with counseling   Resource/Environmental Concerns [employer], Other   Other Concerns food   Do you have any questions related to your care experience, your pregnancy, plans for delivery, any concerns, etc? No   Other Education HANDS, How to find a dentist, Insurance benefits/Incentives, Transportation Assistance, SNAP Benefits, WIC Benefits            Pt doing well today, no c/o.  Has WIC, SNAP, and insurance benefits.  Sent and explained intake packet + [facility] and [facility].  She is  familiar with [employer], but currently has transportation.  Pt requests BP cuff, MNN will drop off at [facility] office before her appt, as requested by pt.  Hx etoh abuse, in counseling now.  Has IOP, working to get custody back of her youngest daughter.  Maternal warning s/s reviewed, pt verbalized understanding.  Encouraged to reach out with any needs.  Will follow up again in 2 wks/prn.      Manjeet Smith RN  Maternity Nurse Navigator    7/29/2025, 10:00 EDT

## 2025-08-05 ENCOUNTER — TRANSCRIBE ORDERS (OUTPATIENT)
Dept: ULTRASOUND IMAGING | Facility: HOSPITAL | Age: 30
End: 2025-08-05
Payer: COMMERCIAL

## 2025-08-05 DIAGNOSIS — N96 RECURRENT PREGNANCY LOSS: Primary | ICD-10-CM

## 2025-08-05 DIAGNOSIS — Z79.899 HIGH RISK MEDICATION USE: ICD-10-CM

## 2025-08-05 DIAGNOSIS — Z87.59 HISTORY OF INTRAUTERINE FETAL DEATH IN PREVIOUS PREGNANCY: ICD-10-CM

## 2025-08-05 DIAGNOSIS — O09.219 HIGH RISK PREGNANCY DUE TO HISTORY OF PRETERM LABOR, ANTEPARTUM: ICD-10-CM

## 2025-08-12 ENCOUNTER — PATIENT OUTREACH (OUTPATIENT)
Dept: LABOR AND DELIVERY | Facility: HOSPITAL | Age: 30
End: 2025-08-12
Payer: COMMERCIAL

## 2025-08-14 ENCOUNTER — PATIENT OUTREACH (OUTPATIENT)
Dept: LABOR AND DELIVERY | Facility: HOSPITAL | Age: 30
End: 2025-08-14
Payer: COMMERCIAL

## 2025-08-18 ENCOUNTER — PATIENT OUTREACH (OUTPATIENT)
Dept: LABOR AND DELIVERY | Facility: HOSPITAL | Age: 30
End: 2025-08-18
Payer: COMMERCIAL

## 2025-08-19 ENCOUNTER — OFFICE VISIT (OUTPATIENT)
Dept: OBSTETRICS AND GYNECOLOGY | Facility: CLINIC | Age: 30
End: 2025-08-19
Payer: COMMERCIAL

## 2025-08-19 ENCOUNTER — HOSPITAL ENCOUNTER (OUTPATIENT)
Dept: ULTRASOUND IMAGING | Facility: HOSPITAL | Age: 30
Discharge: HOME | End: 2025-08-19
Admitting: NURSE PRACTITIONER
Payer: COMMERCIAL

## 2025-08-19 ENCOUNTER — PATIENT OUTREACH (OUTPATIENT)
Dept: LABOR AND DELIVERY | Facility: HOSPITAL | Age: 30
End: 2025-08-19
Payer: COMMERCIAL

## 2025-08-19 VITALS
HEART RATE: 83 BPM | DIASTOLIC BLOOD PRESSURE: 67 MMHG | SYSTOLIC BLOOD PRESSURE: 109 MMHG | BODY MASS INDEX: 42.49 KG/M2 | HEIGHT: 65 IN | WEIGHT: 255 LBS | OXYGEN SATURATION: 97 % | TEMPERATURE: 97.8 F

## 2025-08-19 DIAGNOSIS — O09.219 HIGH RISK PREGNANCY DUE TO HISTORY OF PRETERM LABOR, ANTEPARTUM: ICD-10-CM

## 2025-08-19 DIAGNOSIS — Z79.899 HIGH RISK MEDICATION USE: ICD-10-CM

## 2025-08-19 DIAGNOSIS — O09.40 ENCOUNTER FOR SUPERVISION OF HIGH RISK PREGNANCY WITH GRAND MULTIPARITY, ANTEPARTUM: ICD-10-CM

## 2025-08-19 DIAGNOSIS — N96 RECURRENT PREGNANCY LOSS: ICD-10-CM

## 2025-08-19 DIAGNOSIS — B18.2 VIRAL HEPATITIS C CARRIER: ICD-10-CM

## 2025-08-19 DIAGNOSIS — Z87.59 HISTORY OF INTRAUTERINE FETAL DEATH IN PREVIOUS PREGNANCY: ICD-10-CM

## 2025-08-19 DIAGNOSIS — Z91.89 AT RISK FOR DOMESTIC ABUSE: ICD-10-CM

## 2025-08-19 DIAGNOSIS — E66.01 MORBID OBESITY WITH BMI OF 40.0-44.9, ADULT: Primary | ICD-10-CM

## 2025-08-19 DIAGNOSIS — F10.90 ALCOHOL USE DISORDER: ICD-10-CM

## 2025-08-19 DIAGNOSIS — F15.21 METHAMPHETAMINE USE DISORDER, MODERATE, IN EARLY REMISSION: ICD-10-CM

## 2025-08-19 DIAGNOSIS — O09.90 HIGH RISK PREGNANCY, ANTEPARTUM: ICD-10-CM

## 2025-08-19 PROBLEM — O24.113 TYPE 2 DIABETES MELLITUS IN PREGNANCY, THIRD TRIMESTER: Status: ACTIVE | Noted: 2025-08-19

## 2025-08-19 PROCEDURE — 76811 OB US DETAILED SNGL FETUS: CPT

## 2025-08-19 PROCEDURE — 76817 TRANSVAGINAL US OBSTETRIC: CPT

## 2025-08-19 RX ORDER — FOLIC ACID 1 MG/1
1 TABLET ORAL DAILY
Qty: 90 TABLET | Refills: 3 | Status: SHIPPED | OUTPATIENT
Start: 2025-08-19

## 2025-08-19 RX ORDER — GUAIFENESIN 600 MG/1
1200 TABLET, EXTENDED RELEASE ORAL 2 TIMES DAILY
Qty: 20 TABLET | Refills: 0 | Status: SHIPPED | OUTPATIENT
Start: 2025-08-19

## 2025-08-19 RX ORDER — DOXYLAMINE SUCCINATE 25 MG/1
25 TABLET ORAL NIGHTLY
Qty: 30 TABLET | Refills: 2 | Status: SHIPPED | OUTPATIENT
Start: 2025-08-19

## 2025-08-19 RX ORDER — DEXTROAMPHETAMINE SACCHARATE, AMPHETAMINE ASPARTATE, DEXTROAMPHETAMINE SULFATE AND AMPHETAMINE SULFATE 5; 5; 5; 5 MG/1; MG/1; MG/1; MG/1
20 TABLET ORAL 2 TIMES DAILY
COMMUNITY

## 2025-08-20 ENCOUNTER — PATIENT ROUNDING (BHMG ONLY) (OUTPATIENT)
Dept: OBSTETRICS AND GYNECOLOGY | Facility: CLINIC | Age: 30
End: 2025-08-20
Payer: COMMERCIAL

## 2025-08-22 ENCOUNTER — TRANSCRIBE ORDERS (OUTPATIENT)
Dept: ULTRASOUND IMAGING | Facility: HOSPITAL | Age: 30
End: 2025-08-22
Payer: COMMERCIAL

## 2025-08-22 DIAGNOSIS — Z79.899 HIGH RISK MEDICATION USE: ICD-10-CM

## 2025-08-22 DIAGNOSIS — O09.219 HIGH RISK PREGNANCY DUE TO HISTORY OF PRETERM LABOR, ANTEPARTUM: Primary | ICD-10-CM

## (undated) DEVICE — TBG W FLTR FOR BERKELEY SYSTEM

## (undated) DEVICE — SOL IRR H2O BO 1000ML STRL

## (undated) DEVICE — LAG PERI GYN: Brand: MEDLINE INDUSTRIES, INC.

## (undated) DEVICE — HOSE BT TO BT VAC CURETTAGE SNGL PT USE EA/10

## (undated) DEVICE — ST COLL BLD VAC W/HNDL 0.375IN DISP

## (undated) DEVICE — GLV SURG NEOPRN SENSICARE PF SZ/6.5 LF EA/1PR

## (undated) DEVICE — SYS FLUID MGMT HYST SAFETOUCH